# Patient Record
Sex: FEMALE | Race: WHITE | NOT HISPANIC OR LATINO | Employment: OTHER | URBAN - METROPOLITAN AREA
[De-identification: names, ages, dates, MRNs, and addresses within clinical notes are randomized per-mention and may not be internally consistent; named-entity substitution may affect disease eponyms.]

---

## 2017-03-23 ENCOUNTER — HOSPITAL ENCOUNTER (OUTPATIENT)
Dept: RADIOLOGY | Facility: MEDICAL CENTER | Age: 67
End: 2017-03-23
Attending: FAMILY MEDICINE
Payer: MEDICARE

## 2017-03-23 DIAGNOSIS — Z13.9 SCREENING: ICD-10-CM

## 2017-03-23 PROCEDURE — 77063 BREAST TOMOSYNTHESIS BI: CPT

## 2020-06-08 ENCOUNTER — HOSPITAL ENCOUNTER (OUTPATIENT)
Dept: HOSPITAL 8 - CFH | Age: 70
Discharge: HOME | End: 2020-06-08
Attending: FAMILY MEDICINE
Payer: MEDICARE

## 2020-06-08 DIAGNOSIS — Z12.31: Primary | ICD-10-CM

## 2020-06-08 PROCEDURE — 77067 SCR MAMMO BI INCL CAD: CPT

## 2020-06-08 PROCEDURE — 77063 BREAST TOMOSYNTHESIS BI: CPT

## 2021-01-15 DIAGNOSIS — Z23 NEED FOR VACCINATION: ICD-10-CM

## 2021-07-08 ENCOUNTER — APPOINTMENT (RX ONLY)
Dept: URBAN - METROPOLITAN AREA CLINIC 4 | Facility: CLINIC | Age: 71
Setting detail: DERMATOLOGY
End: 2021-07-08

## 2021-07-08 DIAGNOSIS — L57.0 ACTINIC KERATOSIS: ICD-10-CM

## 2021-07-08 DIAGNOSIS — L81.4 OTHER MELANIN HYPERPIGMENTATION: ICD-10-CM

## 2021-07-08 DIAGNOSIS — D18.0 HEMANGIOMA: ICD-10-CM

## 2021-07-08 DIAGNOSIS — D22 MELANOCYTIC NEVI: ICD-10-CM

## 2021-07-08 DIAGNOSIS — L82.1 OTHER SEBORRHEIC KERATOSIS: ICD-10-CM

## 2021-07-08 DIAGNOSIS — D485 NEOPLASM OF UNCERTAIN BEHAVIOR OF SKIN: ICD-10-CM

## 2021-07-08 PROBLEM — D48.5 NEOPLASM OF UNCERTAIN BEHAVIOR OF SKIN: Status: ACTIVE | Noted: 2021-07-08

## 2021-07-08 PROBLEM — D22.5 MELANOCYTIC NEVI OF TRUNK: Status: ACTIVE | Noted: 2021-07-08

## 2021-07-08 PROBLEM — D18.01 HEMANGIOMA OF SKIN AND SUBCUTANEOUS TISSUE: Status: ACTIVE | Noted: 2021-07-08

## 2021-07-08 PROCEDURE — ? BIOPSY BY SHAVE METHOD

## 2021-07-08 PROCEDURE — ? REFERRAL CORRESPONDENCE

## 2021-07-08 PROCEDURE — 11102 TANGNTL BX SKIN SINGLE LES: CPT

## 2021-07-08 PROCEDURE — 99203 OFFICE O/P NEW LOW 30 MIN: CPT | Mod: 25

## 2021-07-08 PROCEDURE — 17000 DESTRUCT PREMALG LESION: CPT | Mod: 59

## 2021-07-08 PROCEDURE — ? LIQUID NITROGEN

## 2021-07-08 PROCEDURE — ? COUNSELING

## 2021-07-08 ASSESSMENT — LOCATION ZONE DERM
LOCATION ZONE: LEG
LOCATION ZONE: NOSE
LOCATION ZONE: TRUNK
LOCATION ZONE: LIP

## 2021-07-08 ASSESSMENT — LOCATION DETAILED DESCRIPTION DERM
LOCATION DETAILED: LEFT BUTTOCK
LOCATION DETAILED: LEFT INFERIOR VERMILION LIP
LOCATION DETAILED: NASAL DORSUM
LOCATION DETAILED: LEFT PROXIMAL CALF

## 2021-07-08 ASSESSMENT — LOCATION SIMPLE DESCRIPTION DERM
LOCATION SIMPLE: NOSE
LOCATION SIMPLE: LEFT CALF
LOCATION SIMPLE: LEFT LIP
LOCATION SIMPLE: LEFT BUTTOCK

## 2021-09-08 NOTE — PROGRESS NOTES
Urogynecology and Pelvic Reconstructive Surgery Consultation Visit    Akilah Landry MRN:[id number] :1950    Referred by: Dr. Jessie Sloan    Reason for Visit:   Chief Complaint   Patient presents with   • Uterine Prolapse         Subjective     History of Presenting Illness:    Ms.Janice Anika Landry is a 71 y.o. year old P2 who was referred by her ObGyn Dr. Jessie Sloan for the evaluation and management of prolapse/cystocele.     She first noticed symptoms 1.5 years ago, and is progressively worsening. Notices the most when gardening and bending over. Feels like something falling out of the vagina. She strains to have a BM and this brings out prolapse. Denies splinting      Prior Pelvic surgery:   Tubal ligation     Prior treatment:   No     Fluid intake:   4 8oz glasses water  Occasional juice - cranberry    Pelvic floor symptom review:     Bladder:   Voids per day: 3-5 Voids per night: 0-1      Urinary incontinence episodes per day: occasional    Urge leakage:  On Movement to Bathroom and Full Bladder   Stress leakage: None   Continuous / insensible urine loss: No    Nocturnal enuresis: No    Leakage volume: Drops   Number of pads/day: none    Bladder emptying: Complete   Voiding symptoms: None   UTI in last 12 months: No   Other urologic history: none      Prolapse:     Prolapse symptoms: Bulge, Exteriorized Tissue and Pelvic Pressure   Degree of prolapse: Beyond Introitus   Duration of prolapse symptoms: 1.5y      Bowel:    Constipation: Yes, dietary related   Bowel movements per day: 1    Straining to empty bowels: Yes   Splinting to evacuate: No    Painful evacuation: No    Difficulty emptying rectum: No    Incontinence to stool: No   Incontinence to gas: No     Blood in stool: No    Hemorrhoids: No    Bowel conditions: none   Most recent colonoscopy: 2018, return in 5 years       Sexual function:    Sexually active: No , however may be interested again if she meets someone   Gender of partners:  "Male   Pain with intercourse: No       Pelvic Pain: No      Past medical and surgical history    Past obstetric history   Number of vaginal deliveries: 2   Number of  deliveries: 0   History of vacuum/forceps: No    History of obstetric anal sphincter injury: No     Past gynecological history:    Last menstrual period: Patient's last menstrual period was 2001.   History of abnormal uterine bleeding: No    History of fibroids: No    History of endometrial polyps:  No    History of endometriosis: No    History of cervical dysplasia: No    Last pap:    Prior GYN surgery: Tubal Ligation    Past medical history:  Past Medical History:   Diagnosis Date   • GERD (gastroesophageal reflux disease) 3/5/2015   • Trigger finger of right hand 3/5/2015   • Hypertension      Past surgical history:  Past Surgical History:   Procedure Laterality Date   • TUBAL COAGULATION LAPAROSCOPIC BILATERAL     • WRIST ORIF      right     Medications:has a current medication list which includes the following prescription(s): estradiol, mometasone, benazepril, and omeprazole.  Allergies:Patient has no known allergies.  Family history:  Family History   Problem Relation Age of Onset   • Cancer Sister         breast   • Heart Disease Mother    • Hypertension Mother    • Cancer Mother         breast   • Heart Disease Father         pacemaker   • Hypertension Father    • Hypertension Sister    • Cancer Maternal Grandfather    • Heart Disease Maternal Grandfather      Social history: reports that she has quit smoking. She has never used smokeless tobacco. She reports current alcohol use. She reports that she does not use drugs.         Objective        /85 (BP Location: Left arm, Patient Position: Sitting, BP Cuff Size: Adult)   Ht 1.727 m (5' 8\")   Wt 68.9 kg (152 lb)   LMP 2001   BMI 23.11 kg/m²     Physical Exam  Constitutional:       Appearance: Normal appearance.   HENT:      Head: Normocephalic.      " Mouth/Throat:      Mouth: Mucous membranes are moist.   Cardiovascular:      Rate and Rhythm: Normal rate.   Pulmonary:      Effort: Pulmonary effort is normal.   Abdominal:      Palpations: Abdomen is soft. There is no mass.      Tenderness: There is no abdominal tenderness.   Skin:     General: Skin is warm and dry.   Neurological:      Mental Status: She is alert.   Psychiatric:         Mood and Affect: Mood normal.         Genitourinary:    External female genitalia: WNL   Vulva: WNL   Bulbocavernosus reflex: Intact   Anal wink reflex: Intact   Perineal sensation: Absent   Urethra: WNL   Vagina: Atrophic   Atrophy: Moderate   Cough stress test: Negative    Pelvic floor:    POP-Q: Aa +2 / Ba +2 / TVL 10 / C -4 / D -8 / Ap -2  / Bp -2 / GH 3.5 / PB 2 AaBa=0 with apex supported    Prolapse stage: 3, anterior predominant   Paravaginal defect: minor bilateral   Cervical elongation: No    Urethral tenderness: No    Bladder/ suprapubic tenderness: No    Levator tenderness: None   Levator muscle tone: WNL   Pelvic floor contraction strength (modified Oxford scale): 3=Moderate   Pelvic floor contraction duration: Brief    Bimanual exam: Small, Mobile Uterus, no palpable adnexal masses   Anal resting tone: WNL   Anal squeeze tone: WNL   Palpable anal sphincter defect: No    Granulation tissue: No    Epithelial erosions: No    Epithelial ulcerations: No    Fistula: None   Vaginal band/stricture: No     Procedure Performed: No    Diagnostic test and records review:    Urine dipstick: neg     Post-void residual: 17mL, performed by Bladder Scanner    Labs: no recent labs    Radiology: n/a    Documentation reviewed: Prior EMR Records    Outside records reviewed: 3 pages           Assessment & Plan     Ms.Janice Anika Landry is a 71 y.o. year old P2 with stage 3 anterior predominant prolapse. We discussed my recommendations for further diagnosis and treatment at length today.     1. Midline cystocele  2. Incomplete uterovaginal  prolapse  3. Rectocele, female  Ms. Landry has symptomatic pelvic organ prolapse, anterior predominant. I reviewed the clinical findings and discussed the pathogenesis extensively, including genetic tendency, aging, menopause and childbirth injuries. Also discussed options for management, including both nonsurgical and surgical options.   - Nonsurgical options include both expectant management and pessary use. I discussed different types of pessary as well as pessary care. The patient can be fitted with a pessary device in the office by a physician and the pessary can either be removed regularly by the patient or left in place, returning to the office every 3 to 6 months for evaluation. I also discussed concomitant treatment with vaginal estrogen at least 2 times a week to help prevent vaginal erosions and infection.   - Surgical options include vaginal and abdominal reconstructive approaches, as well as obliterative approaches which close the vaginal canal. She desires continued ability to have penetrative intercourse and is not a candidate for obliterative approach.  I reviewed that abdominal approaches would include both native tissue repair or a mesh-augmented sacralcolpopexy, which is usually performed through robotic-assisted laparoscopy or sometimes through a Pfannenstiel skin incision for more complex cases. Lightweight synthetic mesh augmentation via sacrocolpopexy is offered for abdominal approaches, which can reduce recurrence risk of prolapse but has a risk (1-3%) for mesh exposure. She is also a candidate for native tissue vaginal surgery which could include a vaginal hysterectomy with vault suspension or uterine-sparing hysteropexy, anterior/posterior repair, and perineorrhaphy. I discussed the technical details including risks and benefits of each of these options with the patient at length. All questions were answered.  - She opts for the most durable reconstructive repair, and will be booked for exam  under anesthesia, robotic assisted laparoscopic supracervical hysterectomy, bilateral salpingooophorectomy, sacrocervicopexy, possible anterior repair, possible paravaginal repair, possible posterior repair/perineorrhaphy, possible mid urethral sling, cystourethroscopy, and any indicated procedure.  - She will return for a preoperative visit, consents, and simple cystometric gram 1 week prior to her surgery  - Printed counseling detailing preoperative considerations Intra-Op considerations and postop considerations was given to the patient with instructions to review prior to her preoperative visit.  Please see after visit summary for details    4. Atrophic vaginitis  Her exam confirms vaginal atrophy / genitorurinary syndrome of menopause. This is very common and due to low estrogen levels, which render the vaginal tissue thin, irritated, and open to colonization with gut sujit. This can lead to irritation, dryness, painful sex, urinary infections and urinary urgency. Discussed risks, benefits, and indications for vaginal estrogen therapy.  Vaginal estrogen has negligible absorption into the bloodstream and is not associated with increased risks for uterine or breast cancers.  Recommend initiating therapy preoperatively to enhance quality of vaginal tissues.  Prescription given for estrace vaginal cream to be placed inside vagina nightly for 2 weeks, then twice weekly thereafter. The effects of vaginal estrogen can take weeks to months.    Other orders  - estradiol (ESTRACE VAGINAL) 0.1 MG/GM vaginal cream; Apply 1g cream inside vagina nightly for 2 weeks, then twice per week thereafter  Dispense: 1 Each; Refill: 3           Preethi Amezcua MD, FACOG    Female Pelvic Medicine and Reconstructive Surgery  Department of Obstetrics and Gynecology  [facility]    CC: Jessie Sloan MD    This medical record contains text that has been entered with the  assistance of computer voice recognition and dictation software.  Therefore, it may contain unintended errors in text, spelling, punctuation, or grammar

## 2021-09-09 ENCOUNTER — GYNECOLOGY VISIT (OUTPATIENT)
Dept: OBGYN | Facility: CLINIC | Age: 71
End: 2021-09-09
Payer: MEDICARE

## 2021-09-09 VITALS
DIASTOLIC BLOOD PRESSURE: 85 MMHG | SYSTOLIC BLOOD PRESSURE: 133 MMHG | BODY MASS INDEX: 23.04 KG/M2 | WEIGHT: 152 LBS | HEIGHT: 68 IN

## 2021-09-09 DIAGNOSIS — N81.11 MIDLINE CYSTOCELE: ICD-10-CM

## 2021-09-09 DIAGNOSIS — N81.6 RECTOCELE, FEMALE: ICD-10-CM

## 2021-09-09 DIAGNOSIS — N95.2 ATROPHIC VAGINITIS: ICD-10-CM

## 2021-09-09 DIAGNOSIS — N81.2 INCOMPLETE UTEROVAGINAL PROLAPSE: Primary | ICD-10-CM

## 2021-09-09 PROCEDURE — 99205 OFFICE O/P NEW HI 60 MIN: CPT | Performed by: STUDENT IN AN ORGANIZED HEALTH CARE EDUCATION/TRAINING PROGRAM

## 2021-09-09 RX ORDER — ESTRADIOL 0.1 MG/G
CREAM VAGINAL
Qty: 1 EACH | Refills: 3 | Status: SHIPPED | OUTPATIENT
Start: 2021-09-09

## 2021-09-09 NOTE — PATIENT INSTRUCTIONS
Pre-op: What you should know before your surgery  Laparoscopic/robotic reconstructive surgery for prolapse  (mesh-augmented)      What you should know before your surgery  You are scheduled for surgery to fix your prolapse (vaginal bulge) using sutures and lightweight mesh to suspend pelvic structures back into a supported position. These surgeries are minimally-invasive, using only small “keyhole” cuts on the abdomen and in the vagina. The documents in this packet will outline each part of the surgery. There may be a few “possible” surgeries listed. We use the word “possible” because we tailor the surgery based on your needs. This means we won't know how much surgery you'll need until after you receive anesthesia and fall asleep.     When you fall asleep, the pelvic muscles will relax, allowing us to determine how much surgery you need. In general, we will do as few procedures as possible to fix your prolapse but address each area as needed.     Goals of prolapse surgery: The primary goal of surgery is to repair the prolapse and eliminate the symptoms of a vaginal bulge and pressure. Depending on your symptoms, the surgery may possibly improve bladder emptying and/or rectal emptying, as well as urinary incontinence.      Prolapse recurrence:  Your procedure will utilize a permanent implanted mesh material to enhance the support of your pelvic structures, and reduce the likelihood of your prolapse returning. This will be described in more detail below in the details of your surgery. However, even with this mesh, there is still a long-term risk of prolapse returning (30% of women), and needing an additional surgery (<10% of women).     Sexual function:  Following surgical repair, most patients experience improvements in their sexual function. Surgery tends to improve the general discomfort of sex associated with prolapse. However, if you have a long history of pain with sex (either externally or internally), this is  unlikely to be due to prolapse. Therefore, surgery may not improve these symptoms.     Surgery involves the cutting and re-sewing of the vaginal tissues. Scar tissue may form after surgery, which can lead to painful sex for some patients. In many patients, this new pain goes away over time or can be improved with pelvic physical therapy, lubrication, dilators, or vaginal estrogen.     Bladder urgency and incontinence after surgery:  Bladder tests may be performed before your surgery to see whether you are at risk for new or worsening urinary leakage after prolapse repair. Our bladder testing is a great tool to find out who is at risk for urinary leakage, but it is not perfect. There is a chance you may have new or increased leakage with coughing, sneezing, or laughing after surgery. Problems with leakage can be addressed in a number of ways. Bladder urgency and/or frequency often improves after surgery, but it may worsen in some patients. We have various medications and therapies that can help with this urgency after surgery, if necessary.    Risk of postoperative pain:   Pain after prolapse surgery is a normal part of the healing process, but usually well-tolerated. Common areas of pain include the pelvis, buttocks, hips and back, often related to positioning.   Try changing your position when sitting or resting in bed to relieve the pain    Expect to have some pain when you are discharged home. This should improve with time and with use of medications. See “after surgery” instructions for more details on pain control.      General risks of pelvic reconstructive surgery: Specific risks for your procedure are discussed separately  ? Anesthesia: With modern anesthetics and monitoring equipment, complications due to anesthesia are very rare. Your anesthesiologist will discuss what will be most suitable for you on the day of surgery  ? Bleeding: All surgery results in bleeding, however this surgery usually amounts to  blood loss between a tablespoon and a teacup. Large amounts of blood loss that requires a blood transfusion are not common (only 1-2% of women) in prolapse surgery.  ? Blood transfusion, if needed, is safe. Minor reactions like fever or allergy occur in less than 1% of transfusions. Riskan infection or mismatched blood is very rare (less than 1 out of every 100,000 transfusions).   ? Infection: The most common infection with prolapse surgery is a urinary tract infection (UTI). This is easily treated. Wound infections occur in 2-3% of patients. Rarely, infection of the abdominal/vaginal wounds may occur, or abscesses in the pelvis may develop. These infections may need to be treated with antibiotics or another procedure to fix them. Risk factors for infections and wound complications include diabetes, smoking, obesity, and other chronic illnesses.  ? Blood Clots: Clots in the blood vessels of the legs and lungs are potentially dangerous and can occur in patients undergoing prolapse surgery. This is prevented with leg compression during surgery, and sometimes, an injected blood thinner. We strongly encourage you to stay mobile because this is an important way to prevent clots.  ? Damage to surrounding organs. The pelvic organs are all very close together. The risk of damage to other organs increases if you have had prior pelvic surgeries, as well as a history of endometriosis or pelvic infection. These conditions can cause scar tissue to develop in the pelvis. Scar tissue can cause organs to stick together, making the surgery more difficult.    ? Ureter and bladder damage: The ureters are tubes that carry urine from the kidneys to the bladder. They travel very close to the uterus and vagina. The bladder is attached to both your uterus and the front of the vagina. Damage/injury can happen during prolapse-repair procedures. A cystoscopy (camera in the bladder) will be done during your surgery to ensure there is no  bladder or ureter damage/injury. If injury occurs, it may require temporary placement of a stent (drainage tube). In rare cases, a larger abdominal incision may be needed to repair the injury. A bladder catheter may need to stay in place temporarily after surgery.  ? Bowel damage: Bowel damage/injury is uncommon during your surgery. Any damage that is seen in surgery will be fixed. Very rarely, a temporary ostomy (connection of bowel to the front of the abdomen and collection of stool with a bag) is required for a higher-risk bowel injury.  ? Vascular damage: Major damage to blood vessels is uncommon. If this occurs, it may require a larger surgery and/or blood transfusion.  ? Fistula: A fistula is an abnormal connection between the vagina and either the bladder or rectum. A fistula leads to leakage of urine or stool. These are rare complications that arise during healing from pelvic surgery that sometimes require additional surgeries to correct. We take great care is during your surgery to prevent these fistulas. If they do occur, your Urogynecologist is the leading expert in fixing them.      Main Procedure:    Laparoscopic/robotic supra-cervical hysterectomy and sacrocervicopexy   (removal of uterus and suspension of cervix to backbone with mesh)       Once you are asleep, small “keyhole” incisions (smaller than ½ inch) will be made in the abdomen in order to place our instruments. Your surgeon may use a robotic system to help complete the surgery through the small incisions. The top of the uterus and your fallopian tubes will then be removed, leaving the lower portion of the uterus (cervix) in place. This is called a hysterectomy and salpingectomy. If you have decided to also remove the ovaries (oophorectomy), this will also be performed. Then, a lightweight permanent mesh will be sewn onto the cervix and the front and back surfaces of the vagina. This will lift up the prolapse and attach to a strong band of  tissue on your sacrum (lower back bone). We will then look into your bladder with a camera to make sure that no damage was done, and that your ureters (the tubes that carry urine from the kidneys to the bladder) are working. Sometimes we are unable to perform the mesh suspension safely, and may need to adjust to a different approach using other structures around the vagina.    The mesh we use is a lightweight permanent material. This material is stronger than your own tissues, which means there is less chance of the prolapse coming back again. Please note that the abdominal mesh for this procedure is not included in the recent controversies involving vaginal meshes.     Everybody's body responds to permanent material differently. 2-5% of women who undergo this surgery may note pain with sex, or notice the mesh exposed through the vagina in the future.  This is often easily treated with medication or a small procedure. In rarer instances the mesh can erode into surrounding structures, or lead to pain or recurrent infections. Sometimes the mesh may have to be removed, which would entail additional surgery.  Since we are not removing your cervix, you will need to continue with routine Pap screening for cervical cancer with your gynecologist.        After this procedure is complete, you will be re-examined and then proceed with the following possible procedures:    Possible  Surgical Procedure:   Paravaginal repair  (repair sides of vagina with suture)      You may need a paravaginal repair if the above procedures completely fix your cystocele (anterior/bladder prolapse). In this case, your anterior/bladder prolapse may be due to a weakness in the attachment of the side walls of the vagina. This is repaired using a series of sutures through the vaginal muscle, attaching them to the side of the pelvis (arcus fascia). The paravaginal repair can be performed during laparoscopic, abdominal, or vaginal procedures.     The  risks of this procedure are similar to the above procedures. Often, permanent sutures are used, which can sometimes become exposed (stick out) in the vagina and lead to bleeding  or pain. These can be treated medically with estrogen cream or by removing the suture.        Possible Procedure:   Anterior Repair  (fix prolapse of the vagina/bladder)      After the above procedure, if there is still prolapse in the anterior (front) compartment under the bladder, your surgeon will perform an “anterior repair” to reinforce this tissue. A cut is made along the front wall of your vagina where the bladder is pushing down, and your skin is  from the underlying supportive tissue. This stronger tissue underneath is then repaired using sutures that will dissolve over time. Sometimes excess skin is removed. The skin is then closed. A cystoscopy (camera in bladder) will be performed to confirm the repair is successful and no injury has occurred.     Specific risks for this procedure include damage to the bladder, urethra (the tube where you urinate from) or ureters (tubes that carry urine from the kidneys to the bladder). This is uncommon. Bladder damage may result in having a catheter in the bladder for a longer amount of time.         Post-op: What to expect after your surgery    For urgent post-operative questions or concerns, please call Dr. Amezcua's direct on-call cell line at [phone].     For less-urgent matters (Monday - Friday), you may send a message through [employer] or call the general Women's Health line at [phone] [phone].     Recovery room  You can expect to stay in the recovery room for a few hours until you are alert. There may be a gauze packing in your vagina, which will be removed before you go home. Pain is normal after surgery but should be tolerable. Your pain will become less over the first 2 weeks. If your pain is difficult to control or you need more nursing care, you will stay in the hospital  overnight. Most patients do very well going home on the day of surgery.     Bladder function  You will wake up with a small tube (catheter) in your bladder. A bladder test, called a “void trial”, will be performed by the nurse before you go home. The test is done to make sure you can empty your bladder normally. To do the bladder test, the nurse will fill your bladder with sterile water, remove the catheter, and give you 30 minutes to try and urinate (pee) on your own. If you cannot urinate, or if you only urinate a small amount, you will need to:   ? Go home with a catheter that stays in your bladder OR  ? Learn to put a catheter into your bladder a few times a day to empty it    Use of a catheter is temporary and usually needed for 2-4 days. It is very common for the bladder to work slowly, or sluggishly, after this type of surgery. One in every 3-4 patients will require some type of catheterization. An antibiotic may be prescribed while the catheter is in place to decrease the risk of infection.    Call the surgeon for treatment, if you have signs and symptoms of a urinary tract infection, including:  ? Burning with urination  ? Bladder pain  ? Worsening need to urinate right away,  ? Urine with a bad smell    Vaginal care  Do not go swimming, take sitting baths, and have sexual intercourse for 6 weeks after surgery Do not place anything in your vagina except vaginal estrogen cream, if instructed to do so by your surgeon.   Vaginal discharge and bleeding/spotting is also normal through the entire 6-week recovery. Sometimes small sutures will fall out of the vagina as they dissolve. This is normal. Contact the office with any heavy bleeding, foul discharge or worsening pain.. Contact us with any heavy bleeding, foul discharge or worsening pain.     Pain management  Surgical pain is controlled in most patients with only acetaminophen (Tylenol) and non-steroidal anti-inflammatory drugs (NSAIDs), such as ibuprofen  (Advil). These drugs can be taken together because they do not interact with each other. Check your prescription for specific dosing instructions. A cold compress can help with pain in the vaginal area.  Sometimes, a short course of narcotics, such as oxycodone or hydromorphone is required. We do not recommend using narcotics regularly as it can lead to constipation or dizziness and falls. Do not drive or operate heavy machinery while using narcotics. You are unlikely to become addicted if you need to take a narcotic medication a few times within the first week of your surgery.  After the first few days to one week, your pain should decrease and you should not have pain severe enough to need narcotics. If you continue having severe pain, contact your surgeon for re-evaluation.     Abdominal wound care  The incisions on your abdomen will be closed with either small bandages or a surgical glue. There are also tiny dissolving sutures beneath the skin. You can shower with these in place. In shower, let the soapy warm water run over you incisions. Do not scrub or wipe you incisions. The bandages will fall off on their own, or you can remove them after at least 3 days if they become discolored or dirty. The glue will also fall off on its own after a few weeks. Small sutures that pop out through the skin are normal and will dissolve over time. Contact us if you feel increasing pain or warmth at the incision. Also, call if you see increased redness or discharge (pus) at the incision.      Bowel function  Constipation is common after surgery, and it sometimes take several days before having a normal bowel movement. It is important to use a bowel regimen to keep your stools soft and avoid straining with bowel movements, which may damage the prolapse repair before it has healed. Most patients will be given a prescription for a stool softener (docusate) as well as a gentle laxative (Miralax or lactulose), which adds water to  the stool to make it easier to pass. Take these daily throughout your recovery, and hold for a day if you develop diarrhea. Please call us if you experience any repeated episodes of vomiting, worsening abdominal pain/bloating, or are unable to have a bowel movement for more than 3 days.     Activity restrictions  During the first 6 weeks you should avoid any type of heavy lifting.  Gentle walking is good exercise. Start with about 10 minutes a day when you feel ready and build up gradually. Avoid repetitive squatting or bending at the waist. Avoid any fitness-type training, aerobics, etc. for at least 6 weeks after surgery. Listen to your body during the recovery period, and increase activity when you feel comfortable. Generally, you will need 4-6 weeks off work. This period may be longer if you have a very physical job.    Return to sex  When your surgeon clears you to resume sex (if desired), you should start out slowly and to use adequate lubrication. Your vagina and pelvis have been re-structured, and it can take time to get used to sex after surgery. Most scar tissue softens over time and discomfort improves. If discomfort does not go away, contact us to discuss to schedule an exam and to discuss further options. In some cases, your surgeon may prescribe a low dose of vaginal estrogen to help with vaginal dryness and pain that may happen with sex.

## 2021-09-09 NOTE — Clinical Note
Dear Dr. Sloan,     Thank you for the interesting referral. Please see my consultation note attached.     Best regards,   Preethi Amezcua MD

## 2021-09-09 NOTE — NON-PROVIDER
Patient here c/o bladder is falling out   LMP= 2001   BCM:tubes tied   Last pap date/result:06/11/21 pt states normal   Last mammogram if applicable   Phone number:[phone]  Pharmacy confirmed.

## 2021-09-21 ENCOUNTER — GYNECOLOGY VISIT (OUTPATIENT)
Dept: OBGYN | Facility: CLINIC | Age: 71
End: 2021-09-21
Payer: MEDICARE

## 2021-09-21 VITALS
HEART RATE: 71 BPM | WEIGHT: 155 LBS | SYSTOLIC BLOOD PRESSURE: 131 MMHG | BODY MASS INDEX: 23.57 KG/M2 | DIASTOLIC BLOOD PRESSURE: 78 MMHG

## 2021-09-21 DIAGNOSIS — N81.6 RECTOCELE, FEMALE: ICD-10-CM

## 2021-09-21 DIAGNOSIS — N81.11 MIDLINE CYSTOCELE: ICD-10-CM

## 2021-09-21 DIAGNOSIS — N81.2 INCOMPLETE UTEROVAGINAL PROLAPSE: Primary | ICD-10-CM

## 2021-09-21 LAB
APPEARANCE UR: CLEAR
BILIRUB UR STRIP-MCNC: NORMAL MG/DL
COLOR UR AUTO: YELLOW
GLUCOSE UR STRIP.AUTO-MCNC: NEGATIVE MG/DL
KETONES UR STRIP.AUTO-MCNC: NEGATIVE MG/DL
LEUKOCYTE ESTERASE UR QL STRIP.AUTO: NEGATIVE
NITRITE UR QL STRIP.AUTO: NEGATIVE
PH UR STRIP.AUTO: 7.5 [PH] (ref 5–8)
PROT UR QL STRIP: NEGATIVE MG/DL
RBC UR QL AUTO: NEGATIVE
SP GR UR STRIP.AUTO: 1.02
UROBILINOGEN UR STRIP-MCNC: NORMAL MG/DL

## 2021-09-21 PROCEDURE — 81002 URINALYSIS NONAUTO W/O SCOPE: CPT | Performed by: STUDENT IN AN ORGANIZED HEALTH CARE EDUCATION/TRAINING PROGRAM

## 2021-09-21 PROCEDURE — 51725 SIMPLE CYSTOMETROGRAM: CPT | Performed by: STUDENT IN AN ORGANIZED HEALTH CARE EDUCATION/TRAINING PROGRAM

## 2021-09-21 RX ORDER — IBUPROFEN 400 MG/1
TABLET ORAL
Qty: 60 TABLET | Refills: 1 | Status: SHIPPED | OUTPATIENT
Start: 2021-09-21

## 2021-09-21 RX ORDER — ACETAMINOPHEN 500 MG
TABLET ORAL
Qty: 60 TABLET | Refills: 1 | Status: SHIPPED | OUTPATIENT
Start: 2021-09-21

## 2021-09-21 RX ORDER — POLYETHYLENE GLYCOL 3350 17 G/17G
17 POWDER, FOR SOLUTION ORAL DAILY
Qty: 30 EACH | Refills: 1 | Status: SHIPPED | OUTPATIENT
Start: 2021-09-21

## 2021-09-21 NOTE — NON-PROVIDER
PT here today for a pre op 09/29/2021   PT States no questions or concerns   Hysterectomy? Never  Good #:  [phone]   Bladder Scan: N/A  Pharmacy Verified

## 2021-09-21 NOTE — PROCEDURES
Procedure Note - Cystometrogram    Procedure: Simple cystometrogram (38755)    Pre-operative diagnosis:  Incomplete uterovaginal prolapse (N81.2),     Indication: Ms. Panchal is a 71 year old with symptomatic uterovaginal prolapse desiring definitive surgical management. Her symptoms include bulge and pressure. She presents for simple CMG testing today to assess for occult stress incontinence with prolapse reduction in order to guide surgical management    Verbal consent was obtained after review of risk and benefit.     Chaperone: Elida Smith    Procedure: The patient was placed in the lithotomy position in the exam. She underwent sterile prep with betadine prior to catheterization. There was a negative urinalysis.  A 16F Gonzalez catheter was easily placed into the bladder.  With a postvoid residual of 30 mL.  The bladder was slowly filled against gravity with sterile water, until capacity was reached.Prolapse was reduced with a cotton scopette for stress testing. There were no complications.     Findings:    - First sensation: 80 mL  - First desire: 240 mL  - Strong Desire: 300  mL  - CMG capacity: 400 mL  - Stress leakage with prolapse reduction:   o Empty: Negative  o 150mL: Negative  o Capacity: Negative   - Leakage with DO: No  - Uninhibited detrusor contractions present: No    Assessment:   No occult stress urinary incontinence is demonstrated with reduction of prolapse during CMG today.     Plan:   - No concomitant antiincontinence procedures recommended during her prolapse repair surgery     See corresponding E/M visit for full surgical and perioperative plan.       Preethi Amezcua MD, FACOG    Female Pelvic Medicine and Reconstructive Surgery  Department of Obstetrics and Gynecology  [facility]

## 2021-09-21 NOTE — PROGRESS NOTES
Urogynecology and Pelvic Reconstructive Surgery Consultation Visit    Akilah Landry MRN:[id number]    Referred by: Dr. Jessie Sloan    Reason for Visit:   Chief Complaint   Patient presents with   • Uterine Prolapse         Subjective     History of Presenting Illness:    Ms.Janice Anika Landry is a 70yo with stage 3 uterovaginal prolapse wo is scheduled for upcoming surgical correction. She reports having started vaginal estrogen therapy after last appointment without issue, although the medication was expensive    Prior HPI:   She was referred by her ObGyn Dr. Jessie Sloan for the evaluation and management of prolapse/cystocele.   She first noticed symptoms 1.5 years ago, and is progressively worsening. Notices the most when gardening and bending over. Feels like something falling out of the vagina. She strains to have a BM and this brings out prolapse. Denies splinting      Prior Pelvic surgery:   Tubal ligation     Prior treatment:   No     Fluid intake:   4 8oz glasses water  Occasional juice - cranberry    Pelvic floor symptom review:     Bladder:   Voids per day: 3-5 Voids per night: 0-1      Urinary incontinence episodes per day: occasional    Urge leakage:  On Movement to Bathroom and Full Bladder   Stress leakage: None   Continuous / insensible urine loss: No    Nocturnal enuresis: No    Leakage volume: Drops   Number of pads/day: none    Bladder emptying: Complete   Voiding symptoms: None   UTI in last 12 months: No   Other urologic history: none      Prolapse:     Prolapse symptoms: Bulge, Exteriorized Tissue and Pelvic Pressure   Degree of prolapse: Beyond Introitus   Duration of prolapse symptoms: 1.5y      Bowel:    Constipation: Yes, dietary related   Bowel movements per day: 1    Straining to empty bowels: Yes   Splinting to evacuate: No    Painful evacuation: No    Difficulty emptying rectum: No    Incontinence to stool: No   Incontinence to gas: No     Blood in stool: No     Hemorrhoids: No    Bowel conditions: none   Most recent colonoscopy: 2018, return in 5 years       Sexual function:    Sexually active: No , however may be interested again if she meets someone   Gender of partners: Male   Pain with intercourse: No       Pelvic Pain: No      Past medical and surgical history    Past obstetric history   Number of vaginal deliveries: 2   Number of  deliveries: 0   History of vacuum/forceps: No    History of obstetric anal sphincter injury: No     Past gynecological history:    Last menstrual period: Patient's last menstrual period was 2001.   History of abnormal uterine bleeding: No    History of fibroids: No    History of endometrial polyps:  No    History of endometriosis: No    History of cervical dysplasia: No    Last pap:    Prior GYN surgery: Tubal Ligation    Past medical history:  Past Medical History:   Diagnosis Date   • GERD (gastroesophageal reflux disease) 3/5/2015   • Trigger finger of right hand 3/5/2015   • Hypertension      Past surgical history:  Past Surgical History:   Procedure Laterality Date   • TUBAL COAGULATION LAPAROSCOPIC BILATERAL     • WRIST ORIF      right     Medications:has a current medication list which includes the following prescription(s): acetaminophen, ibuprofen, polyethylene glycol/lytes, estradiol, mometasone, benazepril, and omeprazole.  Allergies:Patient has no known allergies.  Family history:  Family History   Problem Relation Age of Onset   • Cancer Sister         breast   • Heart Disease Mother    • Hypertension Mother    • Cancer Mother         breast   • Heart Disease Father         pacemaker   • Hypertension Father    • Hypertension Sister    • Cancer Maternal Grandfather    • Heart Disease Maternal Grandfather      Social history: reports that she has quit smoking. She has never used smokeless tobacco. She reports current alcohol use. She reports that she does not use drugs.         Objective        /78 (BP  Location: Right arm, Patient Position: Sitting, BP Cuff Size: Adult)   Pulse 71   Wt 70.3 kg (155 lb)   LMP 01/01/2001   BMI 23.57 kg/m²     Physical Exam  Vitals reviewed. Exam conducted with a chaperone present.   Constitutional:       Appearance: Normal appearance.   HENT:      Head: Normocephalic.      Mouth/Throat:      Mouth: Mucous membranes are moist.   Cardiovascular:      Rate and Rhythm: Normal rate.   Pulmonary:      Effort: Pulmonary effort is normal.   Abdominal:      Palpations: Abdomen is soft. There is no mass.      Tenderness: There is no abdominal tenderness.   Skin:     General: Skin is warm and dry.   Neurological:      Mental Status: She is alert.   Psychiatric:         Mood and Affect: Mood normal.         Genitourinary:    External female genitalia: WNL   Vulva: WNL   Bulbocavernosus reflex: Intact   Anal wink reflex: Intact   Perineal sensation: Absent   Urethra: WNL   Vagina: Atrophic   Atrophy: Moderate   Cough stress test: Negative    Pelvic floor (prior exam):    POP-Q: Aa +2 / Ba +2 / TVL 10 / C -4 / D -8 / Ap -2  / Bp -2 / GH 3.5 / PB 2 AaBa=0 with apex supported    Prolapse stage: 3, anterior predominant   Paravaginal defect: minor bilateral   Cervical elongation: No    Urethral tenderness: No    Bladder/ suprapubic tenderness: No    Levator tenderness: None   Levator muscle tone: WNL   Pelvic floor contraction strength (modified Oxford scale): 3=Moderate   Pelvic floor contraction duration: Brief    Bimanual exam: Small, Mobile Uterus, no palpable adnexal masses   Anal resting tone: WNL   Anal squeeze tone: WNL   Palpable anal sphincter defect: No    Granulation tissue: No    Epithelial erosions: No    Epithelial ulcerations: No    Fistula: None   Vaginal band/stricture: No     Procedure Performed:  cystometrogram (see separate note)             Assessment & Plan     Ms.Janice Anika Landry is a 71 y.o. year old P2 with stage 3 anterior predominant prolapse. We discussed my  recommendations for further diagnosis and treatment at length today.     1. Midline cystocele  2. Incomplete uterovaginal prolapse  3. Rectocele, female  Scheduled on 9/29 for robotic-assisted supracervical hysterectomy, bilateral salpingo-oophorectomy, sacrocervicopexy, cystourethroscopy, possible anterior repair, possible posterior repair/perineorrhaphy, possible laparotomy and any indicated procedure  Supplemental FPMRS Consent was signed and witnessed today. Pre-operative CMG did not demonstrate stress urinary incontinence with reduction of prolapse and she was counseled against a concomitant midurethral sling.   Benefits of surgery were reviewed, including functional outcomes (bladder/bowel/sexual). Risks of surgery were  also discussed including anesthesia, bleeding, infection, damage to surrounding organs (bladder, ureter, urethra, bowel, blood vessel, nerves), possible blood transfusion, recurrent prolapse,  mesh exposure/erosion, transient voiding dysfunction requiring catheterization, new/worsening urinary incontinence, pain with sex.   Specifically she was counselled as to what to expect on the day of surgery in the holding area, counseled that medical students may be involved in her care. She will likely go home on the same day as the surgery. She was counseled on what to expect in the recovery room including voiding trial and possible vaginal packing removal, as well as what to expect if voiding trial is unsuccessful - given options of indwelling catheter vs. intermittent straight cath.   Discussed trajectory of recovery, including maximizing NSAIDs and Tylenol, and that narcotics are not routinely given.  Discussed restrictions including heavy lifting requiring straining, driving while on narcotics, nothing in the vagina and no bathing/swimming for at least 6 weeks until evaluated in the office.*Please see the corresponding after visit summary counseling packet for detailed counseling provided for the  patient.   - Labs: CMP/BMP at pre-op visit  - Pre-op meds: acetaminophen 1000mg PO, phenazopyridine 200mg PO, Cefazolin 2gm IV,  - Post-op prescriptions sent today: ibuprofen, tylenol, miralax      4. Atrophic vaginitis  Continue vaginal estrogen until surgery, then resume 2 weeks post-op.            Preethi Amezcua MD, FACOG    Female Pelvic Medicine and Reconstructive Surgery  Department of Obstetrics and Gynecology  [facility]      This medical record contains text that has been entered with the assistance of computer voice recognition and dictation software.  Therefore, it may contain unintended errors in text, spelling, punctuation, or grammar

## 2021-09-23 ENCOUNTER — PRE-ADMISSION TESTING (OUTPATIENT)
Dept: ADMISSIONS | Facility: MEDICAL CENTER | Age: 71
End: 2021-09-23
Attending: STUDENT IN AN ORGANIZED HEALTH CARE EDUCATION/TRAINING PROGRAM
Payer: MEDICARE

## 2021-09-23 DIAGNOSIS — Z01.810 PRE-OPERATIVE CARDIOVASCULAR EXAMINATION: ICD-10-CM

## 2021-09-23 DIAGNOSIS — Z01.812 PRE-OPERATIVE LABORATORY EXAMINATION: ICD-10-CM

## 2021-09-23 LAB
ANION GAP SERPL CALC-SCNC: 12 MMOL/L (ref 7–16)
BASOPHILS # BLD AUTO: 1 % (ref 0–1.8)
BASOPHILS # BLD: 0.04 K/UL (ref 0–0.12)
BUN SERPL-MCNC: 23 MG/DL (ref 8–22)
CALCIUM SERPL-MCNC: 9.6 MG/DL (ref 8.5–10.5)
CHLORIDE SERPL-SCNC: 104 MMOL/L (ref 96–112)
CO2 SERPL-SCNC: 21 MMOL/L (ref 20–33)
CREAT SERPL-MCNC: 0.79 MG/DL (ref 0.5–1.4)
EKG IMPRESSION: NORMAL
EOSINOPHIL # BLD AUTO: 0.06 K/UL (ref 0–0.51)
EOSINOPHIL NFR BLD: 1.5 % (ref 0–6.9)
ERYTHROCYTE [DISTWIDTH] IN BLOOD BY AUTOMATED COUNT: 47.8 FL (ref 35.9–50)
GLUCOSE SERPL-MCNC: 90 MG/DL (ref 65–99)
HCT VFR BLD AUTO: 39.8 % (ref 37–47)
HGB BLD-MCNC: 12.6 G/DL (ref 12–16)
IMM GRANULOCYTES # BLD AUTO: 0.02 K/UL (ref 0–0.11)
IMM GRANULOCYTES NFR BLD AUTO: 0.5 % (ref 0–0.9)
LYMPHOCYTES # BLD AUTO: 1.44 K/UL (ref 1–4.8)
LYMPHOCYTES NFR BLD: 35 % (ref 22–41)
MCH RBC QN AUTO: 29.9 PG (ref 27–33)
MCHC RBC AUTO-ENTMCNC: 31.7 G/DL (ref 33.6–35)
MCV RBC AUTO: 94.3 FL (ref 81.4–97.8)
MONOCYTES # BLD AUTO: 0.42 K/UL (ref 0–0.85)
MONOCYTES NFR BLD AUTO: 10.2 % (ref 0–13.4)
NEUTROPHILS # BLD AUTO: 2.14 K/UL (ref 2–7.15)
NEUTROPHILS NFR BLD: 51.8 % (ref 44–72)
NRBC # BLD AUTO: 0 K/UL
NRBC BLD-RTO: 0 /100 WBC
PLATELET # BLD AUTO: 216 K/UL (ref 164–446)
PMV BLD AUTO: 10.1 FL (ref 9–12.9)
POTASSIUM SERPL-SCNC: 4 MMOL/L (ref 3.6–5.5)
RBC # BLD AUTO: 4.22 M/UL (ref 4.2–5.4)
SODIUM SERPL-SCNC: 137 MMOL/L (ref 135–145)
WBC # BLD AUTO: 4.1 K/UL (ref 4.8–10.8)

## 2021-09-23 PROCEDURE — U0003 INFECTIOUS AGENT DETECTION BY NUCLEIC ACID (DNA OR RNA); SEVERE ACUTE RESPIRATORY SYNDROME CORONAVIRUS 2 (SARS-COV-2) (CORONAVIRUS DISEASE [COVID-19]), AMPLIFIED PROBE TECHNIQUE, MAKING USE OF HIGH THROUGHPUT TECHNOLOGIES AS DESCRIBED BY CMS-2020-01-R: HCPCS

## 2021-09-23 PROCEDURE — 85025 COMPLETE CBC W/AUTO DIFF WBC: CPT

## 2021-09-23 PROCEDURE — 93010 ELECTROCARDIOGRAM REPORT: CPT | Performed by: INTERNAL MEDICINE

## 2021-09-23 PROCEDURE — 36415 COLL VENOUS BLD VENIPUNCTURE: CPT

## 2021-09-23 PROCEDURE — 80048 BASIC METABOLIC PNL TOTAL CA: CPT

## 2021-09-23 PROCEDURE — 93005 ELECTROCARDIOGRAM TRACING: CPT

## 2021-09-23 PROCEDURE — U0005 INFEC AGEN DETEC AMPLI PROBE: HCPCS

## 2021-09-23 PROCEDURE — C9803 HOPD COVID-19 SPEC COLLECT: HCPCS

## 2021-09-24 LAB
SARS-COV-2 RNA RESP QL NAA+PROBE: NOTDETECTED
SPECIMEN SOURCE: NORMAL

## 2021-09-28 NOTE — OR NURSING
COVID-19 Pre-surgery screenin. Do you have an undiagnosed respiratory illness or symptoms such as coughing or sneezing? No  a. Onset of Sx  n/a  b. Acute vs. chronic respiratory illness  n/a    2. Do you have an unexplained fever greater than 100.4 degrees Fahrenheit or 38 degrees Celsius?  No        3. Have you had direct exposure to a patient who tested positive for Covid-19?  No        4. Have you had any loss of your sense of taste or smell, N/V or sore throat?  No    Patient has been informed of 2 visitor policy and asked to wear a mask at all times.  Eating or drinking are only allowed in dining areas.   Yes

## 2021-09-29 ENCOUNTER — ANESTHESIA (OUTPATIENT)
Dept: SURGERY | Facility: MEDICAL CENTER | Age: 71
End: 2021-09-29
Payer: MEDICARE

## 2021-09-29 ENCOUNTER — ANESTHESIA EVENT (OUTPATIENT)
Dept: SURGERY | Facility: MEDICAL CENTER | Age: 71
End: 2021-09-29
Payer: MEDICARE

## 2021-09-29 ENCOUNTER — HOSPITAL ENCOUNTER (OUTPATIENT)
Facility: MEDICAL CENTER | Age: 71
End: 2021-09-29
Attending: STUDENT IN AN ORGANIZED HEALTH CARE EDUCATION/TRAINING PROGRAM | Admitting: STUDENT IN AN ORGANIZED HEALTH CARE EDUCATION/TRAINING PROGRAM
Payer: MEDICARE

## 2021-09-29 VITALS
TEMPERATURE: 97.3 F | DIASTOLIC BLOOD PRESSURE: 71 MMHG | RESPIRATION RATE: 16 BRPM | SYSTOLIC BLOOD PRESSURE: 141 MMHG | OXYGEN SATURATION: 95 % | WEIGHT: 151.24 LBS | HEART RATE: 81 BPM | BODY MASS INDEX: 22.92 KG/M2 | HEIGHT: 68 IN

## 2021-09-29 LAB — PATHOLOGY CONSULT NOTE: NORMAL

## 2021-09-29 PROCEDURE — 58542 LSH W/T/O UT 250 G OR LESS: CPT | Performed by: STUDENT IN AN ORGANIZED HEALTH CARE EDUCATION/TRAINING PROGRAM

## 2021-09-29 PROCEDURE — 700111 HCHG RX REV CODE 636 W/ 250 OVERRIDE (IP): Performed by: ANESTHESIOLOGY

## 2021-09-29 PROCEDURE — A9270 NON-COVERED ITEM OR SERVICE: HCPCS | Performed by: ANESTHESIOLOGY

## 2021-09-29 PROCEDURE — 700101 HCHG RX REV CODE 250: Performed by: STUDENT IN AN ORGANIZED HEALTH CARE EDUCATION/TRAINING PROGRAM

## 2021-09-29 PROCEDURE — 500002 HCHG ADHESIVE, DERMABOND: Performed by: STUDENT IN AN ORGANIZED HEALTH CARE EDUCATION/TRAINING PROGRAM

## 2021-09-29 PROCEDURE — 700105 HCHG RX REV CODE 258: Performed by: ANESTHESIOLOGY

## 2021-09-29 PROCEDURE — 160031 HCHG SURGERY MINUTES - 1ST 30 MINS LEVEL 5: Performed by: STUDENT IN AN ORGANIZED HEALTH CARE EDUCATION/TRAINING PROGRAM

## 2021-09-29 PROCEDURE — 160009 HCHG ANES TIME/MIN: Performed by: STUDENT IN AN ORGANIZED HEALTH CARE EDUCATION/TRAINING PROGRAM

## 2021-09-29 PROCEDURE — 160042 HCHG SURGERY MINUTES - EA ADDL 1 MIN LEVEL 5: Performed by: STUDENT IN AN ORGANIZED HEALTH CARE EDUCATION/TRAINING PROGRAM

## 2021-09-29 PROCEDURE — 502648 HCHG APPLICATOR, EVICEL: Performed by: STUDENT IN AN ORGANIZED HEALTH CARE EDUCATION/TRAINING PROGRAM

## 2021-09-29 PROCEDURE — 160036 HCHG PACU - EA ADDL 30 MINS PHASE I: Performed by: STUDENT IN AN ORGANIZED HEALTH CARE EDUCATION/TRAINING PROGRAM

## 2021-09-29 PROCEDURE — 502714 HCHG ROBOTIC SURGERY SERVICES: Performed by: STUDENT IN AN ORGANIZED HEALTH CARE EDUCATION/TRAINING PROGRAM

## 2021-09-29 PROCEDURE — 501568 HCHG TROCAR, BLUNTPORT 12MM: Performed by: STUDENT IN AN ORGANIZED HEALTH CARE EDUCATION/TRAINING PROGRAM

## 2021-09-29 PROCEDURE — 160035 HCHG PACU - 1ST 60 MINS PHASE I: Performed by: STUDENT IN AN ORGANIZED HEALTH CARE EDUCATION/TRAINING PROGRAM

## 2021-09-29 PROCEDURE — 501330 HCHG SET, CYSTO IRRIG TUBING: Performed by: STUDENT IN AN ORGANIZED HEALTH CARE EDUCATION/TRAINING PROGRAM

## 2021-09-29 PROCEDURE — 160025 RECOVERY II MINUTES (STATS): Performed by: STUDENT IN AN ORGANIZED HEALTH CARE EDUCATION/TRAINING PROGRAM

## 2021-09-29 PROCEDURE — 501838 HCHG SUTURE GENERAL: Performed by: STUDENT IN AN ORGANIZED HEALTH CARE EDUCATION/TRAINING PROGRAM

## 2021-09-29 PROCEDURE — A9270 NON-COVERED ITEM OR SERVICE: HCPCS | Performed by: STUDENT IN AN ORGANIZED HEALTH CARE EDUCATION/TRAINING PROGRAM

## 2021-09-29 PROCEDURE — 88305 TISSUE EXAM BY PATHOLOGIST: CPT

## 2021-09-29 PROCEDURE — 160002 HCHG RECOVERY MINUTES (STAT): Performed by: STUDENT IN AN ORGANIZED HEALTH CARE EDUCATION/TRAINING PROGRAM

## 2021-09-29 PROCEDURE — C1781 MESH (IMPLANTABLE): HCPCS | Performed by: STUDENT IN AN ORGANIZED HEALTH CARE EDUCATION/TRAINING PROGRAM

## 2021-09-29 PROCEDURE — 700111 HCHG RX REV CODE 636 W/ 250 OVERRIDE (IP): Performed by: STUDENT IN AN ORGANIZED HEALTH CARE EDUCATION/TRAINING PROGRAM

## 2021-09-29 PROCEDURE — 700101 HCHG RX REV CODE 250: Performed by: ANESTHESIOLOGY

## 2021-09-29 PROCEDURE — 160048 HCHG OR STATISTICAL LEVEL 1-5: Performed by: STUDENT IN AN ORGANIZED HEALTH CARE EDUCATION/TRAINING PROGRAM

## 2021-09-29 PROCEDURE — 57425 LAPAROSCOPY SURG COLPOPEXY: CPT | Performed by: STUDENT IN AN ORGANIZED HEALTH CARE EDUCATION/TRAINING PROGRAM

## 2021-09-29 PROCEDURE — 700102 HCHG RX REV CODE 250 W/ 637 OVERRIDE(OP): Performed by: STUDENT IN AN ORGANIZED HEALTH CARE EDUCATION/TRAINING PROGRAM

## 2021-09-29 PROCEDURE — 160046 HCHG PACU - 1ST 60 MINS PHASE II: Performed by: STUDENT IN AN ORGANIZED HEALTH CARE EDUCATION/TRAINING PROGRAM

## 2021-09-29 PROCEDURE — 700102 HCHG RX REV CODE 250 W/ 637 OVERRIDE(OP): Performed by: ANESTHESIOLOGY

## 2021-09-29 PROCEDURE — 110454 HCHG SHELL REV 250: Performed by: STUDENT IN AN ORGANIZED HEALTH CARE EDUCATION/TRAINING PROGRAM

## 2021-09-29 DEVICE — MESH RESTORELLE Y 24 X 4 CM: Type: IMPLANTABLE DEVICE | Site: VAGINA | Status: FUNCTIONAL

## 2021-09-29 RX ORDER — LIDOCAINE HYDROCHLORIDE 20 MG/ML
INJECTION, SOLUTION EPIDURAL; INFILTRATION; INTRACAUDAL; PERINEURAL PRN
Status: DISCONTINUED | OUTPATIENT
Start: 2021-09-29 | End: 2021-09-29 | Stop reason: SURG

## 2021-09-29 RX ORDER — OXYCODONE HCL 5 MG/5 ML
5 SOLUTION, ORAL ORAL
Status: COMPLETED | OUTPATIENT
Start: 2021-09-29 | End: 2021-09-29

## 2021-09-29 RX ORDER — HYDROMORPHONE HYDROCHLORIDE 1 MG/ML
0.1 INJECTION, SOLUTION INTRAMUSCULAR; INTRAVENOUS; SUBCUTANEOUS
Status: DISCONTINUED | OUTPATIENT
Start: 2021-09-29 | End: 2021-09-29 | Stop reason: HOSPADM

## 2021-09-29 RX ORDER — CEFOTETAN DISODIUM 2 G/20ML
INJECTION, POWDER, FOR SOLUTION INTRAMUSCULAR; INTRAVENOUS PRN
Status: DISCONTINUED | OUTPATIENT
Start: 2021-09-29 | End: 2021-09-29 | Stop reason: SURG

## 2021-09-29 RX ORDER — ONDANSETRON 2 MG/ML
4 INJECTION INTRAMUSCULAR; INTRAVENOUS EVERY 6 HOURS PRN
Status: DISCONTINUED | OUTPATIENT
Start: 2021-09-29 | End: 2021-09-29 | Stop reason: HOSPADM

## 2021-09-29 RX ORDER — HALOPERIDOL 5 MG/ML
1 INJECTION INTRAMUSCULAR
Status: DISCONTINUED | OUTPATIENT
Start: 2021-09-29 | End: 2021-09-29 | Stop reason: HOSPADM

## 2021-09-29 RX ORDER — OXYCODONE HCL 5 MG/5 ML
10 SOLUTION, ORAL ORAL
Status: COMPLETED | OUTPATIENT
Start: 2021-09-29 | End: 2021-09-29

## 2021-09-29 RX ORDER — PHENAZOPYRIDINE HYDROCHLORIDE 200 MG/1
200 TABLET, FILM COATED ORAL ONCE
Status: COMPLETED | OUTPATIENT
Start: 2021-09-29 | End: 2021-09-29

## 2021-09-29 RX ORDER — ONDANSETRON 2 MG/ML
4 INJECTION INTRAMUSCULAR; INTRAVENOUS
Status: DISCONTINUED | OUTPATIENT
Start: 2021-09-29 | End: 2021-09-29 | Stop reason: HOSPADM

## 2021-09-29 RX ORDER — DIPHENHYDRAMINE HYDROCHLORIDE 50 MG/ML
12.5 INJECTION INTRAMUSCULAR; INTRAVENOUS
Status: DISCONTINUED | OUTPATIENT
Start: 2021-09-29 | End: 2021-09-29 | Stop reason: HOSPADM

## 2021-09-29 RX ORDER — SODIUM CHLORIDE, SODIUM LACTATE, POTASSIUM CHLORIDE, CALCIUM CHLORIDE 600; 310; 30; 20 MG/100ML; MG/100ML; MG/100ML; MG/100ML
INJECTION, SOLUTION INTRAVENOUS
Status: DISCONTINUED | OUTPATIENT
Start: 2021-09-29 | End: 2021-09-29 | Stop reason: SURG

## 2021-09-29 RX ORDER — HYDRALAZINE HYDROCHLORIDE 20 MG/ML
5 INJECTION INTRAMUSCULAR; INTRAVENOUS
Status: DISCONTINUED | OUTPATIENT
Start: 2021-09-29 | End: 2021-09-29 | Stop reason: HOSPADM

## 2021-09-29 RX ORDER — BUPIVACAINE HYDROCHLORIDE 2.5 MG/ML
INJECTION, SOLUTION EPIDURAL; INFILTRATION; INTRACAUDAL
Status: DISCONTINUED | OUTPATIENT
Start: 2021-09-29 | End: 2021-09-29 | Stop reason: HOSPADM

## 2021-09-29 RX ORDER — HYDROMORPHONE HYDROCHLORIDE 1 MG/ML
0.2 INJECTION, SOLUTION INTRAMUSCULAR; INTRAVENOUS; SUBCUTANEOUS
Status: DISCONTINUED | OUTPATIENT
Start: 2021-09-29 | End: 2021-09-29 | Stop reason: HOSPADM

## 2021-09-29 RX ORDER — HYDROMORPHONE HYDROCHLORIDE 1 MG/ML
0.4 INJECTION, SOLUTION INTRAMUSCULAR; INTRAVENOUS; SUBCUTANEOUS
Status: DISCONTINUED | OUTPATIENT
Start: 2021-09-29 | End: 2021-09-29 | Stop reason: HOSPADM

## 2021-09-29 RX ORDER — MIDAZOLAM HYDROCHLORIDE 1 MG/ML
INJECTION INTRAMUSCULAR; INTRAVENOUS PRN
Status: DISCONTINUED | OUTPATIENT
Start: 2021-09-29 | End: 2021-09-29 | Stop reason: SURG

## 2021-09-29 RX ORDER — DEXAMETHASONE SODIUM PHOSPHATE 4 MG/ML
INJECTION, SOLUTION INTRA-ARTICULAR; INTRALESIONAL; INTRAMUSCULAR; INTRAVENOUS; SOFT TISSUE PRN
Status: DISCONTINUED | OUTPATIENT
Start: 2021-09-29 | End: 2021-09-29 | Stop reason: SURG

## 2021-09-29 RX ORDER — BENAZEPRIL HYDROCHLORIDE 40 MG/1
40 TABLET ORAL DAILY
COMMUNITY

## 2021-09-29 RX ORDER — SCOLOPAMINE TRANSDERMAL SYSTEM 1 MG/1
1 PATCH, EXTENDED RELEASE TRANSDERMAL ONCE
Status: DISCONTINUED | OUTPATIENT
Start: 2021-09-29 | End: 2021-09-29 | Stop reason: HOSPADM

## 2021-09-29 RX ORDER — LABETALOL HYDROCHLORIDE 5 MG/ML
5 INJECTION, SOLUTION INTRAVENOUS
Status: DISCONTINUED | OUTPATIENT
Start: 2021-09-29 | End: 2021-09-29 | Stop reason: HOSPADM

## 2021-09-29 RX ORDER — ONDANSETRON 2 MG/ML
INJECTION INTRAMUSCULAR; INTRAVENOUS PRN
Status: DISCONTINUED | OUTPATIENT
Start: 2021-09-29 | End: 2021-09-29 | Stop reason: SURG

## 2021-09-29 RX ORDER — ROCURONIUM BROMIDE 10 MG/ML
INJECTION, SOLUTION INTRAVENOUS PRN
Status: DISCONTINUED | OUTPATIENT
Start: 2021-09-29 | End: 2021-09-29 | Stop reason: SURG

## 2021-09-29 RX ORDER — KETOROLAC TROMETHAMINE 30 MG/ML
INJECTION, SOLUTION INTRAMUSCULAR; INTRAVENOUS PRN
Status: DISCONTINUED | OUTPATIENT
Start: 2021-09-29 | End: 2021-09-29 | Stop reason: SURG

## 2021-09-29 RX ORDER — ACETAMINOPHEN 500 MG
1000 TABLET ORAL ONCE
Status: COMPLETED | OUTPATIENT
Start: 2021-09-29 | End: 2021-09-29

## 2021-09-29 RX ORDER — MEPERIDINE HYDROCHLORIDE 25 MG/ML
6.25 INJECTION INTRAMUSCULAR; INTRAVENOUS; SUBCUTANEOUS
Status: DISCONTINUED | OUTPATIENT
Start: 2021-09-29 | End: 2021-09-29 | Stop reason: HOSPADM

## 2021-09-29 RX ADMIN — FENTANYL CITRATE 25 MCG: 50 INJECTION INTRAMUSCULAR; INTRAVENOUS at 16:48

## 2021-09-29 RX ADMIN — SCOPALAMINE 1 PATCH: 1 PATCH, EXTENDED RELEASE TRANSDERMAL at 11:36

## 2021-09-29 RX ADMIN — OXYCODONE HYDROCHLORIDE 5 MG: 5 SOLUTION ORAL at 16:12

## 2021-09-29 RX ADMIN — LIDOCAINE HYDROCHLORIDE 20 MG: 20 INJECTION, SOLUTION EPIDURAL; INFILTRATION; INTRACAUDAL at 12:15

## 2021-09-29 RX ADMIN — SODIUM CHLORIDE, POTASSIUM CHLORIDE, SODIUM LACTATE AND CALCIUM CHLORIDE: 600; 310; 30; 20 INJECTION, SOLUTION INTRAVENOUS at 12:10

## 2021-09-29 RX ADMIN — LIDOCAINE HYDROCHLORIDE 0.5 ML: 10 INJECTION, SOLUTION EPIDURAL; INFILTRATION; INTRACAUDAL; PERINEURAL at 10:59

## 2021-09-29 RX ADMIN — MIDAZOLAM HYDROCHLORIDE 2 MG: 1 INJECTION, SOLUTION INTRAMUSCULAR; INTRAVENOUS at 12:10

## 2021-09-29 RX ADMIN — ROCURONIUM BROMIDE 20 MG: 10 INJECTION, SOLUTION INTRAVENOUS at 13:27

## 2021-09-29 RX ADMIN — PHENAZOPYRIDINE HYDROCHLORIDE 200 MG: 200 TABLET ORAL at 11:52

## 2021-09-29 RX ADMIN — FENTANYL CITRATE 25 MCG: 50 INJECTION INTRAMUSCULAR; INTRAVENOUS at 16:35

## 2021-09-29 RX ADMIN — ROCURONIUM BROMIDE 20 MG: 10 INJECTION, SOLUTION INTRAVENOUS at 14:01

## 2021-09-29 RX ADMIN — FENTANYL CITRATE 50 MCG: 50 INJECTION, SOLUTION INTRAMUSCULAR; INTRAVENOUS at 12:10

## 2021-09-29 RX ADMIN — DEXAMETHASONE SODIUM PHOSPHATE 4 MG: 4 INJECTION, SOLUTION INTRA-ARTICULAR; INTRALESIONAL; INTRAMUSCULAR; INTRAVENOUS; SOFT TISSUE at 12:19

## 2021-09-29 RX ADMIN — FENTANYL CITRATE 50 MCG: 50 INJECTION, SOLUTION INTRAMUSCULAR; INTRAVENOUS at 14:01

## 2021-09-29 RX ADMIN — FENTANYL CITRATE 50 MCG: 50 INJECTION, SOLUTION INTRAMUSCULAR; INTRAVENOUS at 12:36

## 2021-09-29 RX ADMIN — SUGAMMADEX 150 MG: 100 INJECTION, SOLUTION INTRAVENOUS at 15:15

## 2021-09-29 RX ADMIN — ROCURONIUM BROMIDE 20 MG: 10 INJECTION, SOLUTION INTRAVENOUS at 12:48

## 2021-09-29 RX ADMIN — CEFOTETAN DISODIUM 2 G: 2 INJECTION, POWDER, FOR SOLUTION INTRAMUSCULAR; INTRAVENOUS at 12:24

## 2021-09-29 RX ADMIN — ONDANSETRON 4 MG: 2 INJECTION INTRAMUSCULAR; INTRAVENOUS at 14:53

## 2021-09-29 RX ADMIN — FENTANYL CITRATE 50 MCG: 50 INJECTION, SOLUTION INTRAMUSCULAR; INTRAVENOUS at 15:16

## 2021-09-29 RX ADMIN — PROPOFOL 150 MG: 10 INJECTION, EMULSION INTRAVENOUS at 12:15

## 2021-09-29 RX ADMIN — ACETAMINOPHEN 1000 MG: 500 TABLET ORAL at 11:36

## 2021-09-29 RX ADMIN — FENTANYL CITRATE 50 MCG: 50 INJECTION, SOLUTION INTRAMUSCULAR; INTRAVENOUS at 13:27

## 2021-09-29 RX ADMIN — ROCURONIUM BROMIDE 40 MG: 10 INJECTION, SOLUTION INTRAVENOUS at 12:16

## 2021-09-29 RX ADMIN — FENTANYL CITRATE 50 MCG: 50 INJECTION, SOLUTION INTRAMUSCULAR; INTRAVENOUS at 14:46

## 2021-09-29 RX ADMIN — KETOROLAC TROMETHAMINE 30 MG: 30 INJECTION, SOLUTION INTRAMUSCULAR at 14:59

## 2021-09-29 ASSESSMENT — PAIN DESCRIPTION - PAIN TYPE
TYPE: SURGICAL PAIN

## 2021-09-29 ASSESSMENT — PAIN SCALES - GENERAL: PAIN_LEVEL: 2

## 2021-09-29 NOTE — ANESTHESIA TIME REPORT
Anesthesia Start and Stop Event Times     Date Time Event    9/29/2021 11:35 AM Ready for Procedure    9/29/2021 12:10 PM Anesthesia Start    9/29/2021 03:42 PM Anesthesia Stop        Responsible Staff  09/29/21    Name Role Begin End    Tara To M.D. Anesthesiologist 09/29/21 12:10 PM 09/29/21 03:42 PM        Preop Diagnosis (Free Text):  Pre-op Diagnosis     INCOMPLETE UTEROVAGINAL PROLAPSE, CYSTOCELE MIDLINe        Preop Diagnosis (Codes):    Premium Reason  A. 3PM - 7AM    Comments:

## 2021-09-29 NOTE — ANESTHESIA POSTPROCEDURE EVALUATION
Patient: Akilah Landry    Procedure Summary     Date: 09/29/21 Room / Location: [place] [address] / SURGERY [facility]    Anesthesia Start: 1210 Anesthesia Stop: 1542    Procedures:       SACROCOLPOPEXY, ROBOT-ASSISTED, USING DA AMRIT XI (Abdomen)      HYSTERECTOMY,SUPRACERVICAL,ROBOT-ASSISTED,USING DA AMRIT XI (Abdomen)      SALPINGO-OOPHORECTOMY (Bilateral Abdomen)      CYSTOSCOPY - CYSTOURETHROSCOPY (Bladder) Diagnosis: (INCOMPLETE UTEROVAGINAL PROLAPSE, CYSTOCELE MIDLINe)    Surgeons: Preethi Amezcua M.D. Responsible Provider: Tara To M.D.    Anesthesia Type: general ASA Status: 2          Final Anesthesia Type: general  Last vitals  BP   Blood Pressure : 138/70    Temp   36.4 °C (97.5 °F)    Pulse   70   Resp   12    SpO2   98 %      Anesthesia Post Evaluation    Patient location during evaluation: PACU  Patient participation: complete - patient participated  Level of consciousness: awake and alert  Pain score: 2    Airway patency: patent  Anesthetic complications: no  Cardiovascular status: hemodynamically stable  Respiratory status: acceptable  Hydration status: euvolemic    PONV: none          No complications documented.

## 2021-09-29 NOTE — ANESTHESIA PREPROCEDURE EVALUATION
Relevant Problems   CARDIAC   (positive) HTN (hypertension)      GI   (positive) GERD (gastroesophageal reflux disease)       Physical Exam    Airway   Mallampati: II  TM distance: >3 FB  Neck ROM: full       Cardiovascular - normal exam  Rhythm: regular  Rate: normal  (-) murmur     Dental - normal exam           Pulmonary - normal exam  Breath sounds clear to auscultation     Abdominal    Neurological - normal exam                 Anesthesia Plan    ASA 2       Plan - general       Airway plan will be ETT        Plan Factors:   Patient was previously instructed to abstain from smoking on day of procedure.  Patient did not smoke on day of procedure.      Induction: intravenous    Postoperative Plan: Postoperative administration of opioids is intended.    Pertinent diagnostic labs and testing reviewed    Informed Consent:    Anesthetic plan and risks discussed with patient.    Use of blood products discussed with: patient whom.

## 2021-09-29 NOTE — ANESTHESIA PROCEDURE NOTES
Airway    Date/Time: 9/29/2021 12:18 PM  Performed by: Tara To M.D.  Authorized by: Tara To M.D.     Location:  OR  Urgency:  Elective  Indications for Airway Management:  Anesthesia      Spontaneous Ventilation: absent    Sedation Level:  Deep  Preoxygenated: Yes    Patient Position:  Sniffing  Mask Difficulty Assessment:  1 - vent by mask  Final Airway Type:  Endotracheal airway  Final Endotracheal Airway:  ETT  Cuffed: Yes    Technique Used for Successful ETT Placement:  Direct laryngoscopy    Insertion Site:  Oral  Blade Type:  Jade  Laryngoscope Blade/Videolaryngoscope Blade Size:  3  ETT Size (mm):  7.0  Measured from:  Teeth  ETT to Teeth (cm):  20  Placement Verified by: auscultation and capnometry    Cormack-Lehane Classification:  Grade I - full view of glottis  Number of Attempts at Approach:  1

## 2021-09-29 NOTE — OP REPORT
OPERATIVE REPORT    Patient: Akilah Landry    Date of Procedure: 9/29/2021    Procedure(s):    - Robotic-assisted laparoscopic supracervical hysterectomy, bilateral salpingo-oophorectomy (72157)  - Robotic-assisted laparoscopic sacrocervicopexy (96382)  - Cystourethrosocpy             Surgeon:   Preethi Amezcua M.D.    Assistants:   Rosy Stovall M.D.    Findings:    - Exam under anesthesia: Stage 3 prolapse, normal genital hiatus diameter and small rectocele. At the completion of the procedure there was excellent tricompartmental support, no sutures were palpated rectally.    - Laparoscopy: No entry injuries to viscera or vasculature. Normal appearing peritoneal cavity and liver edge without significant adhesions. Normal-appearing uterus, bilateral fallopian tubes and ovaries. Ureters visualized bilaterally through the procedure. Normal survey prior to closure   - Cystourethroscopy: Normal appearing urothelium without lesions, masses, sutures, or perforations. Ureteral orifices noted in the normal orthotopic position, with brisk jets of urine bilaterally the procedure was completed. Urethra was normal in appearance without evidence of stricture, perforation, or diverticulum.              Anesthesiologist/Type of Anesthesia:   Anesthesiologist: Tara To M.D./General    Specimens:  ID Type Source Tests Collected by Time Destination   A : UTERUS WITH BILATERAL TUBES AND OVARIES Tissue Uterus PATHOLOGY SPECIMEN Preethi Amezcua M.D. 9/29/2021 1504         Implants:   Implant Name Type Inv. Item Serial No.  Lot No. LRB No. Used Action   MESH RESTORELLE Y 24 X 4 CM Mesh MESH RESTORELLE Y 24 X 4 CM  COLOPLAST KHADRA [id number] N/A 1 Implanted             IVF: See anesthesia record    Estimated Blood Loss: 50 mL           Drain: None           Complications: None           Indications for Procedure:     Ms. Landry is a 71 year old with stage 3 anterior predominant uterovaginal prolapse. She was  counseled on all approaches to prolapse treatment including observation, pessary and surgery. She was counseled on all methods of surgical prolapse repair including vaginal and abdominal/laparoscopic reconstructive approaches, and obliterative approaches. She opts for durable reconstructive approch via hysterectomy and sacrocervicopexy. Pre-operative cystometrogram did not demonstrate occult stress incontinence with reduction of prolapse  and she was counseled against a concomitant midurethral sling. She was counseled on all risks including bleeding, infection, damage to surrounding organs including bladder, urethra, ureters, bowel, blood vessels and nerves, possible laparotomy or subsequent surgery, as well as risk of recurrent prolapse, new urinary incontinence, transient urinary retention requiring garsia catheterization, pain with intercourse, urogenital fistula, and recurrent prolapse. Both printed and verbal counseling were provided. All questions were answered and consent was signed and witnessed.     Description of Procedure:     I spoke with the patient in the preoperative holding area, where again risks, benefits, indications, and alternatives were reviewed and informed consent was obtained.  She was then transferred to the operative suite, where she was identified, procedure was confirmed.  She was placed in dorsal supine position, given general endotracheal anesthesia without difficulty.  She was then placed in a dorsal lithotomy position  in yellowfin stirrups with all pressure points padded.  She was prepared and draped in usual sterile fashion with arms tucked and all pressure points padded.  An appropriate time-out was performed, where patient was identified, procedure was confirmed, and the operative team was introduced.  It was also confirmed that patient did receive cefazolin 2 g IV for prophylaxis, and she also received DVT prophylaxis with sequential compression devices bilaterally throughout the  case.  At this time, exam under anesthesia revealed findings noted above.   A 16-Portuguese Gonzalez catheter was then placed in the patient's  bladder for drainage throughout the procedure.  The anterior lip of the cervix was visualized with a speculum and grasped with a single-toothed tenaculum. A V-care uterine manipulator was placed easily, and the balloon inflated. The handle was covered with a sterile towel.     At this time, attention was turned to the patient's abdomen. After infiltration with marcaine, a 12mm infraumbilical incision was made, and the subcutaneous tissue dissected to the level of the fascia, which was grasped with kocher clamps, elevated, and entered sharply using heavy Gage scissors. The peritoneum was then grasped with hemostats and entered sharply with Metzenbaum scissors. Digital evaluation noted a correct entry into the peritoneal cavity without any anterior abdominal wall adhesions noted. With an S-retractor delineating the entry path, the 12mm Judy balloon trocar was then advanced into the peritoneal cavity, which was insufflated to a pressure of 15mmHg. An 8mm robotic trocar was then placed through the Judy trocar in preparation for docking. The camera was inserted and a full survey was performed with the above findings noted. No entry injuries to bowl, omentum, mesentery or vasculature were visualized.  At this time, on the patient's right lateral side, approximately 8 cm lateral to the umbilicus, an 8 mm incision that was made after injection with marcaine, and an 8 mm robotic trocar was then advanced under direct visualization of the laparoscope without difficulty.  An 8mm robotic assist port was then placed laterally to this, under direct visualization by the laparoscope. The gas was then switched into Airseal mode. On the patient's left hand side, two additional robotic ports were placed at 8cm and 16cm lateral to the umbilicus after infiltration of Marcaine, under direct  visualization with the laparoscope. Excellent hemostasis was noted at all port sites. At this time, the patient was placed in steep Trendelenburg. The Mi-[employer] Xi robot was then brought in for docking, and instruments were placed under direct visualization.     We then proceeded with the robotic assisted laparoscopic supracervical hysterectomy and bilateral salpingo-oophorectomy in the following fashion: The right adnexa was visualized and the infundibulopelvic ligament was found to be remote from the peristalsing right ureter at the pelvic brim. The right IP ligament was then ligated with bipolar electrocautery and transected with the monopolar scissors with excellent hemostasis noted. Dissection was then carried down along the broad ligament to the round ligament which was cauterized and transected. The round ligament on medial traction, the posterior leaflet of the broad ligament was then dissected further down to the level of the internal cervical os, lateralizing the ureter away from the cervix. Attention was then turned anteriorly where the vesicouterine peritoneum was dissected away from the uterus to create a bladder flap, to the level below the uterine manipulator cup. Uterine vessels were then carefully skeletonized and cauterized and transected perpendicularly at the level of the internal cervical os with excellent hemostasis noted. Attention was then turned to the contralateral side of the uterus where an identical dissection was performed starting with the infundibulopelvic ligament, through the round ligament creating a bladder flap and finally cauterizing and transecting the uterine vessels. The cervix was then transected perpendicularly at the level of the internal os using monopolar electrocautery with excellent hemostasis noted. The uterus tubes and ovaries were then grasped and moved into the upper pelvis for future retrieval at the end of the case. All pedicles were then inspected and found to be  hemostatic at this time.    Attention was then turned to the robotic assisted laparoscopic sacral cervicopexy which proceed in the following fashion: The sigmoid colon was retracted medially with the third arm and the sacral promontory was visualized. The peritoneum overlying the sacral promontory was then lifted and entered sharply with cold scissors. Gentle blunt and sharp dissection was then carried down through presacral adipose tissue to expose the anterior longitudinal ligament of the sacrum. Minimal electrocautery was used and excellent hemostasis was noted. A retroperitoneal tunnel for future covering of the mesh was then created using the monopolar scissors traveling in the right pararectal space and exiting at a position just medial to the proximal right uterosacral ligament. Attention was then turned to the anterior dissection. The cervix was grasped with a robotic tenaculum and placed under tension and the bladder was further dissected away from the vagina through the avascular vesicovaginal space down to the level of the trigone, which is identified with gentle traction on the Gonzalez balloon. Attention was then turned posteriorly where again traction was placed on the cervix using the tenaculum, and the peritoneum was entered midway down the vagina entering the avascular retrorectal space and dissected down to the level of the perineal body with excellent hemostasis noted. The Restorelle Y-mesh, which had been previously trimmed to the patient's measurements, was then brought into the operative field, and affixed to the cervical stroma using 3 [place]-Hunter sutures anteriorly and 3 [place]-Hunter sutures posteriorly. Attention was then turned posteriorly where the remainder of the posterior leaf of the mesh was attached to the posterior vaginal muscularis using a running 3-0 stratfix PDS unidirectional barbed suture in a box pattern with excellent approximation of the mesh to the posterior vaginal tissue without  any bunching. Attention was then turned to the anterior leaflet which was also attached to the anterior vaginal muscularis using a running 3-0 stratafix PDS unidirectional barbed suture in a box pattern with excellent approximation of the mesh to the anterior vaginal tissue without any bunching. An additional 3 interrupted 2-0 vicryl were placed for further mesh approximation. The Y portion of the mesh was then brought through the previously created retroperitoneal tunnel to the level of the sacrum. The mesh was then tensioned robotically and I personally examined for correct tensioning with a vaginal examination. The Y-portion of the mesh was then affixed to the sacral promontory using 2 [place]-Hunter interrupted sutures. Excess mesh was then trimmed and removed from the operative field. Mild oozing was noted anteriorly and at the sacrum, and surgiflo hemostatic agent was applied with excellent hemoastasis noted.  The peritoneum overlying the sacrum was then closed using a 2-0 monocryl figure-of-eight suture x2. The remaining peritoneum was then reapproximated in anterior to posterior fashion using a 2-0 stratafix Monocryl suture, completely covering and retroperitonealized the mesh without visible defects that would allow for viscera to herniate.     Cystourethroscopy was then performed. The Gonzalez catheter was removed and a 70 degree cystoscope was placed into the bladder under direct visualization and the bladder was backfilled with sterile water. A 360 degree survey of the bladder was then performed with the above findings noted, no sutures, mesh or urothelial defects visualized, and excellent bilateral ureteral reflux with pyridium-stained urine. The bladder was drained of all cystoscopic fluid, the cystoscope removed, and the Gonzalez catheter replaced.    All instruments were then removed and the robot undocked. The specimen was placed into a 10 endocatch bag and brought through the umbilical incision, removed with  in-bag morcellation and sent for pathology. All trocars were then removed under direct visualization and the umbilical fascia was closed using a 0-vicryl running suture, and skin closed at all sites with 4-0 monocryl subcuticular suture and dermabond. Excellent hemostasis was noted.     All counts were correct.  The patient was then wakened from general anesthesia easily, and brought to the PACU in stable condition.       Disposition: to PACU, anticipate discharge home this evening.          9/29/2021 3:47 PM Preethi Amezcua M.D.

## 2021-09-29 NOTE — OR NURSING
1540: Pt arrives to PACU asleep and calm. VSS. 5 lap sites to abdomen all CDI. Ice pack applied.     1635: Pt c/o cramping like pain- medicated per MAR. Tolerating sips of water.    1640: Pts daughter called and updated.     1715: Pt c/o right eye irritation, eye was flushed with saline and no improvement. Dr. To notified and states to cover eye for this evening and pt should feel improvement tomorrow.     1740: Pt states cramping has improved and denies nausea. Report called to Alena HIGGINS. Incisions CDI.

## 2021-09-30 ENCOUNTER — TELEPHONE (OUTPATIENT)
Dept: OBGYN | Facility: CLINIC | Age: 71
End: 2021-09-30

## 2021-09-30 NOTE — TELEPHONE ENCOUNTER
Post-op checkup (call)    I called Ms. Landry and confirmed identity. She is POD#1 s/p Robotic ONEL/BSO, sacrocolpopexy, successful voiding trial, discharged home same day.     Pain control: 2/10, only requiring ibu/tylenol  Tolerating diet: yes  Nausea/vomiting: no  Passing gas: yes  Ambulating: yes  Voiding: yes    I reviewed with her again the findings from surgery and the general arc of recovery. All questions answered.     F/u in 6 weeks as scheduled, or PRN if any issues.       Preethi Amezcua MD, FACOG    Female Pelvic Medicine and Reconstructive Surgery  Department of Obstetrics and Gynecology  [facility]  [facility]

## 2021-09-30 NOTE — DISCHARGE INSTRUCTIONS
Post-op: What to expect after your surgery    For urgent post-operative questions or concerns, please call Dr. Amezcua's direct on-call cell line at [phone].     For less-urgent matters (Monday - Friday), you may send a message through [employer] or call the general Women's Health MA line at [phone] [phone].     Bladder function  Try to empty your bladder (urinate) at regular intervals by sitting on the toilet and relaxing.  You may need to adjust your positioning (lean forward or back) to empty the bladder fully. It is important that you do not push or strain to empty your bladder.     Call the surgeon at the number above if you cannot urinate. Also, call for treatment if you have signs and symptoms of a urinary tract infection, including:   • Burning with urination  • Bladder pain  • Worsening need to urinate right away  • Urine with a bad smell    If you are sent home with a catheter:   Empty the catheter bag when it becomes full. The bag should be kept at a level below your hips to drain properly. When you are asleep, the bag should dangle off the bed. and should dangle off of the bed while you are asleep. You will be called the day after you go home to schedule an office visit to test your bladder and remove the catheter.     Vaginal care:   Do not go swimming, take sitting baths, or have sexual intercourse for 6 weeks after surgery. Do not place anything in the vagina except vaginal estrogen cream, if instructed to do so by your surgeon.     Vaginal discharge and bleeding/spotting is also normal through the entire 6-week recovery. Sometimes small sutures will fall out of the vagina as they dissolve. This is normal. Contact the office with any heavy bleeding soaking through pads, bad-smelling discharge, or worsening pain.     Pain management:  Surgical pain is controlled in most patients with only non-steroidal anti-inflammatory drugs (NSAIDs, such as ibuprofen, “Advil”), and acetaminophen (Tylenol). These drugs  can be taken together without interaction. In the hospital, your nurse will give you these medications at regular intervals to both treat and to prevent pain. If these are not controlling your pain, you may ask the nurse for additional medication. When you go home, you will also take NSAIDs and acetaminophen for pain management. Sometimes, a short course of narcotics such as oxycodone and hydromorphone is are required. We do not recommend using narcotics regularly as it can lead to constipation or dizziness, and falls.     Abdominal wound care  The incisions on your abdomen will be closed with either small bandages or a surgical glue. There are also tiny dissolving sutures beneath the skin. You can shower with these in place. In the shower, let the soapy water run over your incisions. Do not scrub or wipe your incisions. Keep the incisions dry for the remainder of the day/night. The bandages will fall off on their own, or you can remove them after at least 3 days if they become discolored or dirty. The glue will also fall off on its own after a few weeks. Small sutures that pop out through the skin are normal and will dissolve over time.    Call us if you feel increasing pain, redness, discharge or warmth at the incision.     Bowel function  Constipation is common after surgery. This means it may take several days before having a normal bowel movement. It is important to take extra steps to keep your stools soft to avoid straining with bowel movements. Straining may damage the prolapse repair before it has healed. Most patients will be given a prescription for a  gentle laxative (Miralax or lactulose). This mediation adds water to the stool to make it easier to pass. Take miralax daily throughout your recovery. Hold for a day if you develop diarrhea.     Call us if you experience any repeated episodes of vomiting, worsening abdominal pain/bloating, or are unable to have a bowel movement for more than 3 days.      Activity restrictions  During the first 6 weeks avoid any type of heavy lifting that requires you to strain.  Gentle walking is good exercise. Start with about 10 minutes a day when you feel ready and build up gradually. Avoid repetitive squatting or bending at the waist.Avoid any fitness-type training, aerobics, etc. for at least 6 weeks after surgery. Generally, you will need 4-6 weeks off work. This period may be longer if you have a very physical job.    Return to sex  When your surgeon clears you to resume sex (if desired), begin slowly and to use enough lubrication to help ease the discomfort. Because your vagina and pelvis have been re-structured, and it can take time to get used to sex after surgery. As scar tissue softens over time you will feel less discomfort. If discomfort does not go away, contact the office to schedule an exam and to discuss other options. In some cases, your surgeon may prescribe a low dose of vaginal estrogen to help with vaginal dryness and pain that may happen with sex.      ACTIVITY: Rest and take it easy for the first 24 hours.  A responsible adult is recommended to remain with you during that time.  It is normal to feel sleepy.  We encourage you to not do anything that requires balance, judgment or coordination.    MILD FLU-LIKE SYMPTOMS ARE NORMAL. YOU MAY EXPERIENCE GENERALIZED MUSCLE ACHES, THROAT IRRITATION, HEADACHE AND/OR SOME NAUSEA.    FOR 24 HOURS DO NOT:  Drive, operate machinery or run household appliances.  Drink beer or alcoholic beverages.   Make important decisions or sign legal documents.    You should CALL YOUR PHYSICIAN if you develop:  Fever greater than 101 degrees F.  Pain not relieved by medication, or persistent nausea or vomiting.  Excessive bleeding (blood soaking through dressing) or unexpected drainage from the wound.  Extreme redness or swelling around the incision site, drainage of pus or foul smelling drainage.  Inability to urinate or empty  your bladder within 8 hours.  Problems with breathing or chest pain.    You should call 911 if you develop problems with breathing or chest pain.    If any questions arise, call your doctor.  If your doctor is not available, please feel free to call the Surgical Center at [phone]. The Contact Center is open Monday through Friday 7AM to 5PM and may speak to a nurse at [phone], or toll free at [phone].     A registered nurse may call you a few days after your surgery to see how you are doing after your procedure.    MEDICATIONS: Resume taking daily medication.  Take prescribed pain medication with food.  If no medication is prescribed, you may take non-aspirin pain medication if needed.  PAIN MEDICATION CAN BE VERY CONSTIPATING.  Take a stool softener or laxative such as senokot, pericolace, or milk of magnesia if needed.    Last pain medication given at 4:15pm.    If your physician has prescribed pain medication that includes Acetaminophen (Tylenol), do not take additional Acetaminophen (Tylenol) while taking the prescribed medication.    Depression / Suicide Risk    As you are discharged from this [facility] facility, it is important to learn how to keep safe from harming yourself.    Recognize the warning signs:  · Abrupt changes in personality, positive or negative- including increase in energy   · Giving away possessions  · Change in eating patterns- significant weight changes-  positive or negative  · Change in sleeping patterns- unable to sleep or sleeping all the time   · Unwillingness or inability to communicate  · Depression  · Unusual sadness, discouragement and loneliness  · Talk of wanting to die  · Neglect of personal appearance   · Rebelliousness- reckless behavior  · Withdrawal from people/activities they love  · Confusion- inability to concentrate     If you or a loved one observes any of these behaviors or has concerns about self-harm, here's what you can do:  · Talk about it-  your feelings and reasons for harming yourself  · Remove any means that you might use to hurt yourself (examples: pills, rope, extension cords, firearm)  · Get professional help from the community (Mental Health, Substance Abuse, psychological counseling)  · Do not be alone:Call your Safe Contact- someone whom you trust who will be there for you.  · Call your local CRISIS HOTLINE 078-7437 or 484-105-2059  · Call your local Children's Mobile Crisis Response Team [place] (390) 803-6731 or www.[employer]  · Call the toll free National Suicide Prevention Hotlines   · National Suicide Prevention Lifeline 122-736-VRGX (5010)  · National Hope Line Network 800-SUICIDE (494-0214)

## 2021-09-30 NOTE — OR NURSING
VSS, pt steady with ambulation, meets discharge criteria. Patient was able to void 300ml after instilling 300ml backfilled, easily. Discharged home with daughter. Wheeled to car with hospital escort. [place] CDI. IV dc'd, cathlon intact. Pt to f/u with physician as directed by physician. Pt to return to ER for any emergent sx. Pt verbalizes understanding of discharge instructions and all questions were answered.

## 2021-11-08 ENCOUNTER — GYNECOLOGY VISIT (OUTPATIENT)
Dept: OBGYN | Facility: CLINIC | Age: 71
End: 2021-11-08
Payer: MEDICARE

## 2021-11-08 VITALS
DIASTOLIC BLOOD PRESSURE: 74 MMHG | HEIGHT: 67 IN | SYSTOLIC BLOOD PRESSURE: 124 MMHG | BODY MASS INDEX: 24.64 KG/M2 | HEART RATE: 66 BPM | WEIGHT: 157 LBS

## 2021-11-08 DIAGNOSIS — Z98.890 POST-OPERATIVE STATE: ICD-10-CM

## 2021-11-08 DIAGNOSIS — N95.2 ATROPHIC VAGINITIS: ICD-10-CM

## 2021-11-08 PROBLEM — N81.2 INCOMPLETE UTEROVAGINAL PROLAPSE: Status: RESOLVED | Noted: 2021-09-09 | Resolved: 2021-11-08

## 2021-11-08 PROBLEM — N81.11 MIDLINE CYSTOCELE: Status: RESOLVED | Noted: 2021-09-09 | Resolved: 2021-11-08

## 2021-11-08 PROBLEM — N81.6 RECTOCELE, FEMALE: Status: RESOLVED | Noted: 2021-09-09 | Resolved: 2021-11-08

## 2021-11-08 LAB
APPEARANCE UR: CLEAR
BILIRUB UR STRIP-MCNC: NORMAL MG/DL
COLOR UR AUTO: NORMAL
GLUCOSE UR STRIP.AUTO-MCNC: NEGATIVE MG/DL
KETONES UR STRIP.AUTO-MCNC: NORMAL MG/DL
LEUKOCYTE ESTERASE UR QL STRIP.AUTO: NORMAL
NITRITE UR QL STRIP.AUTO: NEGATIVE
PH UR STRIP.AUTO: 5.5 [PH] (ref 5–8)
PROT UR QL STRIP: NEGATIVE MG/DL
RBC UR QL AUTO: NEGATIVE
SP GR UR STRIP.AUTO: 1.02
UROBILINOGEN UR STRIP-MCNC: NORMAL MG/DL

## 2021-11-08 PROCEDURE — 99024 POSTOP FOLLOW-UP VISIT: CPT | Performed by: STUDENT IN AN ORGANIZED HEALTH CARE EDUCATION/TRAINING PROGRAM

## 2021-11-08 PROCEDURE — 81002 URINALYSIS NONAUTO W/O SCOPE: CPT | Performed by: STUDENT IN AN ORGANIZED HEALTH CARE EDUCATION/TRAINING PROGRAM

## 2021-11-08 NOTE — PROGRESS NOTES
Urogynecology and Pelvic Reconstructive Surgery Consultation Visit    Akilah Landry MRN:[id number]    Referred by: Dr. Jessie Sloan    Reason for Visit:   Chief Complaint   Patient presents with   • Other     Post Op         Subjective     History of Presenting Illness:    Ms.Janice Anika Landry is a 72yo who is 6 weeks s/p uncomplicated RaSCH/BSO, sacrocervicopexy, cysto. She reports doing well with no issues.     Pain: minimal  Vaginal bleeding: none  Bladder emptying: complete  Incontinence: none  Bowel function: normal          Prior Pelvic surgery:   Tubal ligation  : RaSCH/BSO, sacrocervicopexy, cysto ([facility])       Prior treatment:   No     Fluid intake:   4 8oz glasses water  Occasional juice - cranberry    Pelvic floor symptom review:     Bladder:   Voids per day: 3-5 Voids per night: 0-1      Urinary incontinence episodes per day: occasional    Urge leakage:  On Movement to Bathroom and Full Bladder   Stress leakage: None   Continuous / insensible urine loss: No    Nocturnal enuresis: No    Leakage volume: Drops   Number of pads/day: none    Bladder emptying: Complete   Voiding symptoms: None   UTI in last 12 months: No   Other urologic history: none      Prolapse:     Prolapse symptoms: Resolved     Bowel:    Constipation: Yes, dietary related   Bowel movements per day: 1    Straining to empty bowels: Yes   Splinting to evacuate: No    Painful evacuation: No    Difficulty emptying rectum: No    Incontinence to stool: No   Incontinence to gas: No     Blood in stool: No    Hemorrhoids: No    Bowel conditions: none   Most recent colonoscopy: 2018, return in 5 years       Sexual function:    Sexually active: No , however may be interested again if she meets someone   Gender of partners: Male   Pain with intercourse: No       Pelvic Pain: No      Past medical and surgical history    Past obstetric history   Number of vaginal deliveries: 2   Number of  deliveries: 0   History of  vacuum/forceps: No    History of obstetric anal sphincter injury: No     Past gynecological history:    Last menstrual period: Patient's last menstrual period was 01/01/2001.   History of abnormal uterine bleeding: No    History of fibroids: No    History of endometrial polyps:  No    History of endometriosis: No    History of cervical dysplasia: No    Last pap: 2015       Past medical history:  Past Medical History:   Diagnosis Date   • GERD (gastroesophageal reflux disease) 3/5/2015   • Trigger finger of right hand 3/5/2015   • Cataract     no surgery   • Heart burn    • Hypertension    • Indigestion      Past surgical history:  Past Surgical History:   Procedure Laterality Date   • PB LAPAROSCOPY, SURG, COLPOPEXY  9/29/2021    Procedure: SACROCOLPOPEXY, ROBOT-ASSISTED, USING DA AMRIT XI;  Surgeon: Preethi Amezcua M.D.;  Location: [facility];  Service: Gyn Robotic   • PB CYSTOURETHROSCOPY  9/29/2021    Procedure: CYSTOSCOPY - CYSTOURETHROSCOPY;  Surgeon: Preethi Amezcua M.D.;  Location: [facility];  Service: Gyn Robotic   • HYSTERECTOMY, SUPRACERVICAL, ROBOT-ASSISTED, USING DA VI  9/29/2021    Procedure: HYSTERECTOMY,SUPRACERVICAL,ROBOT-ASSISTED,USING DA AMRIT XI;  Surgeon: Preethi Amezcua M.D.;  Location: SURGERY [facility];  Service: Gyn Robotic   • SALPINGO OOPHORECTOMY Bilateral 9/29/2021    Procedure: SALPINGO-OOPHORECTOMY;  Surgeon: Preethi Amezcua M.D.;  Location: SURGERY [facility];  Service: Gyn Robotic   • TUBAL COAGULATION LAPAROSCOPIC BILATERAL     • WRIST ORIF      right     Medications:has a current medication list which includes the following prescription(s): benazepril, centrum silver 50+women, calcium carb-cholecalciferol, misc natural products, acetaminophen, ibuprofen, polyethylene glycol/lytes, estradiol, and omeprazole.  Allergies:Patient has no known allergies.  Family history:  Family History   Problem Relation Age of Onset   • Cancer Sister         breast   • Heart Disease  Mother    • Hypertension Mother    • Cancer Mother         breast   • Heart Disease Father         pacemaker   • Hypertension Father    • Hypertension Sister    • Cancer Maternal Grandfather    • Heart Disease Maternal Grandfather      Social history: reports that she has quit smoking. Her smoking use included cigarettes. She has a 0.40 pack-year smoking history. She has never used smokeless tobacco. She reports current alcohol use. She reports that she does not use drugs.         Objective        LMP 01/01/2001     Physical Exam  Vitals reviewed. Exam conducted with a chaperone present.   Constitutional:       Appearance: Normal appearance.   HENT:      Head: Normocephalic.      Mouth/Throat:      Mouth: Mucous membranes are moist.   Cardiovascular:      Rate and Rhythm: Normal rate.   Pulmonary:      Effort: Pulmonary effort is normal.   Abdominal:      General: There is no distension.      Palpations: Abdomen is soft. There is no mass.      Tenderness: There is no abdominal tenderness.      Comments: Laparoscopic incisions c/d/i, well-healed, no induration, erythema, discharge   Skin:     General: Skin is warm and dry.   Neurological:      Mental Status: She is alert.   Psychiatric:         Mood and Affect: Mood normal.         Genitourinary:    External female genitalia: WNL   Vulva: WNL   Bulbocavernosus reflex: Intact   Anal wink reflex: Intact   Perineal sensation: Absent   Urethra: WNL   Vagina: Atrophic   Atrophy: Mild   Cough stress test: Negative    Pelvic floor (post-operative):    POP-Q: Aa -2 / Ba -2 / TVL 10 / C -8 / D 9.5 / Ap -2 / Bp -2 / GH 3  / PB 2 /     Prolapse stage: 1    Paravaginal defect: none   Urethral tenderness: No    Bladder/ suprapubic tenderness: No    Levator tenderness: None   Levator muscle tone: WNL   Palpable anal sphincter defect: No    Granulation tissue: No     Mesh/suture exposure: no   Palpable mesh: no   Epithelial erosions: No    Epithelial ulcerations: No    Fistula:  None   Vaginal band/stricture: No     Procedure Performed:  cystometrogram (see separate note)             Assessment & Plan     Ms.Janice Anika Landry is a 71 y.o. year old P2 s/p robotic ONEL/BSO, sacrocolpopexy. Excellent tricompartmental support, meeting all postoperative milestones.     1. Post-operative checkup  Doing very well   - May return to normal activities without restrictions  - Counseled on return to sexual activity, if desired    4. Atrophic vaginitis  Continue vaginal estrogen 1-2 times per week for maintenance    Follow up in 3 months            Preethi Amezcua MD, FACOG    Female Pelvic Medicine and Reconstructive Surgery  Department of Obstetrics and Gynecology  [facility]      This medical record contains text that has been entered with the assistance of computer voice recognition and dictation software.  Therefore, it may contain unintended errors in text, spelling, punctuation, or grammar

## 2021-11-08 NOTE — NON-PROVIDER
Patient here for Post Op Exam  Surgery Date:  09/29/2021  Type Of Surgery: RA Sacrocolpopexy   PT States she feels good since her surgery; Denies complications   Good #: [phone]   Bladder Scan: 0  Pharmacy Verified

## 2022-01-06 ENCOUNTER — APPOINTMENT (RX ONLY)
Dept: URBAN - METROPOLITAN AREA CLINIC 4 | Facility: CLINIC | Age: 72
Setting detail: DERMATOLOGY
End: 2022-01-06

## 2022-01-06 DIAGNOSIS — L90.5 SCAR CONDITIONS AND FIBROSIS OF SKIN: ICD-10-CM

## 2022-01-06 DIAGNOSIS — D485 NEOPLASM OF UNCERTAIN BEHAVIOR OF SKIN: ICD-10-CM

## 2022-01-06 DIAGNOSIS — L70.0 ACNE VULGARIS: ICD-10-CM

## 2022-01-06 DIAGNOSIS — L81.4 OTHER MELANIN HYPERPIGMENTATION: ICD-10-CM | Status: STABLE

## 2022-01-06 PROBLEM — D48.5 NEOPLASM OF UNCERTAIN BEHAVIOR OF SKIN: Status: ACTIVE | Noted: 2022-01-06

## 2022-01-06 PROCEDURE — ? COUNSELING

## 2022-01-06 PROCEDURE — ? REFERRAL CORRESPONDENCE

## 2022-01-06 PROCEDURE — 99213 OFFICE O/P EST LOW 20 MIN: CPT

## 2022-01-06 PROCEDURE — ? DIAGNOSIS COMMENT

## 2022-01-06 PROCEDURE — ? PRESCRIPTION

## 2022-01-06 RX ORDER — TRETIONIN 0.25 MG/G
1 CREAM TOPICAL QHS
Qty: 45 | Refills: 3 | Status: ERX | COMMUNITY
Start: 2022-01-06

## 2022-01-06 RX ADMIN — TRETIONIN 1: 0.25 CREAM TOPICAL at 00:00

## 2022-01-06 ASSESSMENT — LOCATION SIMPLE DESCRIPTION DERM
LOCATION SIMPLE: LEFT BUTTOCK
LOCATION SIMPLE: LEFT CALF
LOCATION SIMPLE: LEFT CHEEK

## 2022-01-06 ASSESSMENT — LOCATION DETAILED DESCRIPTION DERM
LOCATION DETAILED: LEFT BUTTOCK
LOCATION DETAILED: LEFT PROXIMAL CALF
LOCATION DETAILED: LEFT INFERIOR CENTRAL MALAR CHEEK

## 2022-01-06 ASSESSMENT — LOCATION ZONE DERM
LOCATION ZONE: TRUNK
LOCATION ZONE: LEG
LOCATION ZONE: FACE

## 2022-01-06 NOTE — PROCEDURE: COUNSELING
Detail Level: Generalized
Detail Level: Detailed
Topical Retinoid Pregnancy And Lactation Text: This medication is Pregnancy Category C. It is unknown if this medication is excreted in breast milk.
High Dose Vitamin A Counseling: Side effects reviewed, pt to contact office should one occur.
Azithromycin Counseling:  I discussed with the patient the risks of azithromycin including but not limited to GI upset, allergic reaction, drug rash, diarrhea, and yeast infections.
Sarecycline Counseling: Patient advised regarding possible photosensitivity and discoloration of the teeth, skin, lips, tongue and gums.  Patient instructed to avoid sunlight, if possible.  When exposed to sunlight, patients should wear protective clothing, sunglasses, and sunscreen.  The patient was instructed to call the office immediately if the following severe adverse effects occur:  hearing changes, easy bruising/bleeding, severe headache, or vision changes.  The patient verbalized understanding of the proper use and possible adverse effects of sarecycline.  All of the patient's questions and concerns were addressed.
Tetracycline Counseling: Patient counseled regarding possible photosensitivity and increased risk for sunburn.  Patient instructed to avoid sunlight, if possible.  When exposed to sunlight, patients should wear protective clothing, sunglasses, and sunscreen.  The patient was instructed to call the office immediately if the following severe adverse effects occur:  hearing changes, easy bruising/bleeding, severe headache, or vision changes.  The patient verbalized understanding of the proper use and possible adverse effects of tetracycline.  All of the patient's questions and concerns were addressed. Patient understands to avoid pregnancy while on therapy due to potential birth defects.
Doxycycline Pregnancy And Lactation Text: This medication is Pregnancy Category D and not consider safe during pregnancy. It is also excreted in breast milk but is considered safe for shorter treatment courses.
Isotretinoin Pregnancy And Lactation Text: This medication is Pregnancy Category X and is considered extremely dangerous during pregnancy. It is unknown if it is excreted in breast milk.
Topical Clindamycin Pregnancy And Lactation Text: This medication is Pregnancy Category B and is considered safe during pregnancy. It is unknown if it is excreted in breast milk.
Birth Control Pills Counseling: Birth Control Pill Counseling: I discussed with the patient the potential side effects of OCPs including but not limited to increased risk of stroke, heart attack, thrombophlebitis, deep venous thrombosis, hepatic adenomas, breast changes, GI upset, headaches, and depression.  The patient verbalized understanding of the proper use and possible adverse effects of OCPs. All of the patient's questions and concerns were addressed.
Include Pregnancy/Lactation Warning?: No
Dapsone Counseling: I discussed with the patient the risks of dapsone including but not limited to hemolytic anemia, agranulocytosis, rashes, methemoglobinemia, kidney failure, peripheral neuropathy, headaches, GI upset, and liver toxicity.  Patients who start dapsone require monitoring including baseline LFTs and weekly CBCs for the first month, then every month thereafter.  The patient verbalized understanding of the proper use and possible adverse effects of dapsone.  All of the patient's questions and concerns were addressed.
Benzoyl Peroxide Counseling: Patient counseled that medicine may cause skin irritation and bleach clothing.  In the event of skin irritation, the patient was advised to reduce the amount of the drug applied or use it less frequently.   The patient verbalized understanding of the proper use and possible adverse effects of benzoyl peroxide.  All of the patient's questions and concerns were addressed.
Detail Level: Zone
Tazorac Counseling:  Patient advised that medication is irritating and drying.  Patient may need to apply sparingly and wash off after an hour before eventually leaving it on overnight.  The patient verbalized understanding of the proper use and possible adverse effects of tazorac.  All of the patient's questions and concerns were addressed.
High Dose Vitamin A Pregnancy And Lactation Text: High dose vitamin A therapy is contraindicated during pregnancy and breast feeding.
Topical Sulfur Applications Counseling: Topical Sulfur Counseling: Patient counseled that this medication may cause skin irritation or allergic reactions.  In the event of skin irritation, the patient was advised to reduce the amount of the drug applied or use it less frequently.   The patient verbalized understanding of the proper use and possible adverse effects of topical sulfur application.  All of the patient's questions and concerns were addressed.
Azithromycin Pregnancy And Lactation Text: This medication is considered safe during pregnancy and is also secreted in breast milk.
Sarecycline Pregnancy And Lactation Text: This medication is Pregnancy Category D and not consider safe during pregnancy. It is also excreted in breast milk.
Birth Control Pills Pregnancy And Lactation Text: This medication should be avoided if pregnant and for the first 30 days post-partum.
Erythromycin Counseling:  I discussed with the patient the risks of erythromycin including but not limited to GI upset, allergic reaction, drug rash, diarrhea, increase in liver enzymes, and yeast infections.
Benzoyl Peroxide Pregnancy And Lactation Text: This medication is Pregnancy Category C. It is unknown if benzoyl peroxide is excreted in breast milk.
Bactrim Pregnancy And Lactation Text: This medication is Pregnancy Category D and is known to cause fetal risk.  It is also excreted in breast milk.
Minocycline Counseling: Patient advised regarding possible photosensitivity and discoloration of the teeth, skin, lips, tongue and gums.  Patient instructed to avoid sunlight, if possible.  When exposed to sunlight, patients should wear protective clothing, sunglasses, and sunscreen.  The patient was instructed to call the office immediately if the following severe adverse effects occur:  hearing changes, easy bruising/bleeding, severe headache, or vision changes.  The patient verbalized understanding of the proper use and possible adverse effects of minocycline.  All of the patient's questions and concerns were addressed.
Dapsone Pregnancy And Lactation Text: This medication is Pregnancy Category C and is not considered safe during pregnancy or breast feeding.
Erythromycin Pregnancy And Lactation Text: This medication is Pregnancy Category B and is considered safe during pregnancy. It is also excreted in breast milk.
Tazorac Pregnancy And Lactation Text: This medication is not safe during pregnancy. It is unknown if this medication is excreted in breast milk.
Bactrim Counseling:  I discussed with the patient the risks of sulfa antibiotics including but not limited to GI upset, allergic reaction, drug rash, diarrhea, dizziness, photosensitivity, and yeast infections.  Rarely, more serious reactions can occur including but not limited to aplastic anemia, agranulocytosis, methemoglobinemia, blood dyscrasias, liver or kidney failure, lung infiltrates or desquamative/blistering drug rashes.
Spironolactone Counseling: Patient advised regarding risks of diarrhea, abdominal pain, hyperkalemia, birth defects (for female patients), liver toxicity and renal toxicity. The patient may need blood work to monitor liver and kidney function and potassium levels while on therapy. The patient verbalized understanding of the proper use and possible adverse effects of spironolactone.  All of the patient's questions and concerns were addressed.
Topical Sulfur Applications Pregnancy And Lactation Text: This medication is Pregnancy Category C and has an unknown safety profile during pregnancy. It is unknown if this topical medication is excreted in breast milk.
Winlevi Pregnancy And Lactation Text: This medication is considered safe during pregnancy and breastfeeding.
Topical Retinoid counseling:  Patient advised to apply a pea-sized amount only at bedtime and wait 30 minutes after washing their face before applying.  If too drying, patient may add a non-comedogenic moisturizer. The patient verbalized understanding of the proper use and possible adverse effects of retinoids.  All of the patient's questions and concerns were addressed.
Doxycycline Counseling:  Patient counseled regarding possible photosensitivity and increased risk for sunburn.  Patient instructed to avoid sunlight, if possible.  When exposed to sunlight, patients should wear protective clothing, sunglasses, and sunscreen.  The patient was instructed to call the office immediately if the following severe adverse effects occur:  hearing changes, easy bruising/bleeding, severe headache, or vision changes.  The patient verbalized understanding of the proper use and possible adverse effects of doxycycline.  All of the patient's questions and concerns were addressed.
Topical Clindamycin Counseling: Patient counseled that this medication may cause skin irritation or allergic reactions.  In the event of skin irritation, the patient was advised to reduce the amount of the drug applied or use it less frequently.   The patient verbalized understanding of the proper use and possible adverse effects of clindamycin.  All of the patient's questions and concerns were addressed.
Spironolactone Pregnancy And Lactation Text: This medication can cause feminization of the male fetus and should be avoided during pregnancy. The active metabolite is also found in breast milk.
Winlevi Counseling:  I discussed with the patient the risks of topical clascoterone including but not limited to erythema, scaling, itching, and stinging. Patient voiced their understanding.
Isotretinoin Counseling: Patient should get monthly blood tests, not donate blood, not drive at night if vision affected, not share medication, and not undergo elective surgery for 6 months after tx completed. Side effects reviewed, pt to contact office should one occur.

## 2022-01-24 ENCOUNTER — OFFICE VISIT (OUTPATIENT)
Dept: URGENT CARE | Facility: PHYSICIAN GROUP | Age: 72
End: 2022-01-24
Payer: MEDICARE

## 2022-01-24 ENCOUNTER — HOSPITAL ENCOUNTER (OUTPATIENT)
Facility: MEDICAL CENTER | Age: 72
End: 2022-01-24
Attending: NURSE PRACTITIONER
Payer: MEDICARE

## 2022-01-24 VITALS
TEMPERATURE: 98.6 F | DIASTOLIC BLOOD PRESSURE: 72 MMHG | HEART RATE: 79 BPM | BODY MASS INDEX: 23.25 KG/M2 | HEIGHT: 68 IN | RESPIRATION RATE: 16 BRPM | SYSTOLIC BLOOD PRESSURE: 138 MMHG | WEIGHT: 153.4 LBS | OXYGEN SATURATION: 97 %

## 2022-01-24 DIAGNOSIS — J34.89 RHINORRHEA: ICD-10-CM

## 2022-01-24 DIAGNOSIS — R05.9 COUGH: ICD-10-CM

## 2022-01-24 DIAGNOSIS — G44.89 OTHER HEADACHE SYNDROME: ICD-10-CM

## 2022-01-24 DIAGNOSIS — R09.81 NASAL CONGESTION: ICD-10-CM

## 2022-01-24 DIAGNOSIS — J34.89 SINUS PRESSURE: ICD-10-CM

## 2022-01-24 DIAGNOSIS — J01.40 ACUTE NON-RECURRENT PANSINUSITIS: ICD-10-CM

## 2022-01-24 PROBLEM — N81.10 FEMALE CYSTOCELE: Status: ACTIVE | Noted: 2021-06-11

## 2022-01-24 PROCEDURE — U0005 INFEC AGEN DETEC AMPLI PROBE: HCPCS

## 2022-01-24 PROCEDURE — U0003 INFECTIOUS AGENT DETECTION BY NUCLEIC ACID (DNA OR RNA); SEVERE ACUTE RESPIRATORY SYNDROME CORONAVIRUS 2 (SARS-COV-2) (CORONAVIRUS DISEASE [COVID-19]), AMPLIFIED PROBE TECHNIQUE, MAKING USE OF HIGH THROUGHPUT TECHNOLOGIES AS DESCRIBED BY CMS-2020-01-R: HCPCS

## 2022-01-24 PROCEDURE — 99214 OFFICE O/P EST MOD 30 MIN: CPT | Performed by: NURSE PRACTITIONER

## 2022-01-24 RX ORDER — DOXYCYCLINE HYCLATE 100 MG
100 TABLET ORAL 2 TIMES DAILY
Qty: 14 TABLET | Refills: 0 | Status: SHIPPED | OUTPATIENT
Start: 2022-01-24 | End: 2022-01-31

## 2022-01-24 ASSESSMENT — ENCOUNTER SYMPTOMS
MUSCULOSKELETAL NEGATIVE: 1
PSYCHIATRIC NEGATIVE: 1
COUGH: 1
EYES NEGATIVE: 1
SORE THROAT: 1
SINUS PAIN: 1
CARDIOVASCULAR NEGATIVE: 1
NEUROLOGICAL NEGATIVE: 1
CONSTITUTIONAL NEGATIVE: 1
GASTROINTESTINAL NEGATIVE: 1

## 2022-01-24 NOTE — PROGRESS NOTES
Subjective:   Akilah Landry is a 71 y.o. female who presents for Sinus Problem (headahce,coughing, runny nose x12 days, )       HPI  Pt presents for evaluation of a new problem, reports 12-day history of headache, sinus pressure and pain, postnasal drip causing a cough, and clear runny nose.  Patient states that her phlegm/mucus is generally white to clear.  Patient has taken Zicam, throat lozenges, and homeopathic medications with some relief.  Patient denies known history of allergic rhinitis.  Patient denies any specific known ill contacts or exposures.    Review of Systems   Constitutional: Negative.    HENT: Positive for congestion, sinus pain and sore throat.    Eyes: Negative.    Respiratory: Positive for cough.    Cardiovascular: Negative.    Gastrointestinal: Negative.    Genitourinary: Negative.    Musculoskeletal: Negative.    Skin: Negative.    Neurological: Negative.    Psychiatric/Behavioral: Negative.    All other systems reviewed and are negative.      MEDS:   Current Outpatient Medications:   •  tretinoin (RETIN-A) 0.025 % cream, APPLY A PEA SIZE AMOUNT OF CREAM TOPICALLY AT BEDTIME EVERY OTHER NIGHT, THEN INCREASE AS TOLERATED, Disp: , Rfl:   •  benazepril (LOTENSIN) 40 MG tablet, Take 40 mg by mouth every day., Disp: , Rfl:   •  Multiple Vitamins-Minerals (CENTRUM SILVER 50+WOMEN) Tab, Take 1 Tablet by mouth every day., Disp: , Rfl:   •  Calcium Carb-Cholecalciferol (CALCIUM PLUS VITAMIN D3 PO), Take 1 Tablet by mouth every day., Disp: , Rfl:   •  Misc Natural Products (GLUCOSAMINE CHONDROITIN ADV PO), Take 1 Tablet by mouth every day., Disp: , Rfl:   •  acetaminophen (TYLENOL) 500 MG Tab, Take 1000mg (2 tab) three times daily for the first 7 days after surgery. After 7 days, you may take 500mg (1 tab) every 4 hours as needed for pain., Disp: 60 Tablet, Rfl: 1  •  ibuprofen (MOTRIN) 400 MG Tab, Take 800mg every 8 hours for the first 7 days after your surgery. After 7 days, you may take 400mg  "every 4-8 hours as needed for pain., Disp: 60 Tablet, Rfl: 1  •  polyethylene glycol/lytes (MIRALAX) 17 g Pack, Take 1 Packet by mouth every day. Take once every day to prevent constipation after surgery. Hold if you develop diarrhea, then resume the next day., Disp: 30 Each, Rfl: 1  •  estradiol (ESTRACE VAGINAL) 0.1 MG/GM vaginal cream, Apply 1g cream inside vagina nightly for 2 weeks, then twice per week thereafter, Disp: 1 Each, Rfl: 3  •  omeprazole (PRILOSEC) 20 MG delayed-release capsule, Take 1 Cap by mouth every day., Disp: 90 Cap, Rfl: 1  ALLERGIES: No Known Allergies    Patient's PMH, SocHx, SurgHx, FamHx, Drug allergies and medications were reviewed.     Objective:   /72 (BP Location: Right arm, Patient Position: Sitting, BP Cuff Size: Adult)   Pulse 79   Temp 37 °C (98.6 °F) (Temporal)   Resp 16   Ht 1.727 m (5' 8\")   Wt 69.6 kg (153 lb 6.4 oz)   LMP 01/01/2001   SpO2 97%   BMI 23.32 kg/m²     Physical Exam  Vitals and nursing note reviewed.   Constitutional:       General: She is awake.      Appearance: Normal appearance. She is well-developed and normal weight.   HENT:      Head: Normocephalic and atraumatic.      Right Ear: Tympanic membrane, ear canal and external ear normal.      Left Ear: Tympanic membrane, ear canal and external ear normal.      Nose: Congestion and rhinorrhea present.      Right Sinus: Maxillary sinus tenderness and frontal sinus tenderness present.      Left Sinus: Maxillary sinus tenderness and frontal sinus tenderness present.      Mouth/Throat:      Lips: Pink.      Mouth: Mucous membranes are moist.      Pharynx: Oropharynx is clear. Uvula midline.   Eyes:      Extraocular Movements: Extraocular movements intact.      Conjunctiva/sclera: Conjunctivae normal.      Pupils: Pupils are equal, round, and reactive to light.   Neck:      Thyroid: No thyromegaly.      Trachea: Trachea normal.   Cardiovascular:      Rate and Rhythm: Normal rate and regular rhythm.      " Pulses: Normal pulses.      Heart sounds: Normal heart sounds, S1 normal and S2 normal.   Pulmonary:      Effort: Pulmonary effort is normal. No respiratory distress.      Breath sounds: Normal breath sounds. No wheezing, rhonchi or rales.   Abdominal:      General: Bowel sounds are normal.      Palpations: Abdomen is soft.   Musculoskeletal:         General: Normal range of motion.      Cervical back: Full passive range of motion without pain, normal range of motion and neck supple.   Lymphadenopathy:      Cervical: No cervical adenopathy.   Skin:     General: Skin is warm and dry.      Capillary Refill: Capillary refill takes less than 2 seconds.   Neurological:      General: No focal deficit present.      Mental Status: She is alert and oriented to person, place, and time.      Gait: Gait is intact.   Psychiatric:         Attention and Perception: Attention and perception normal.         Mood and Affect: Mood normal.         Speech: Speech normal.         Behavior: Behavior normal. Behavior is cooperative.         Thought Content: Thought content normal.         Judgment: Judgment normal.         Assessment/Plan:   Assessment    1. Acute non-recurrent pansinusitis  - doxycycline (VIBRAMYCIN) 100 MG Tab; Take 1 Tablet by mouth 2 times a day for 7 days.  Dispense: 14 Tablet; Refill: 0    2. Nasal congestion  - SARS-CoV-2 PCR (24 hour In-House): Collect NP swab in VTM; Future    3. Cough  - SARS-CoV-2 PCR (24 hour In-House): Collect NP swab in VTM; Future    4. Other headache syndrome  - SARS-CoV-2 PCR (24 hour In-House): Collect NP swab in VTM; Future    5. Rhinorrhea  - SARS-CoV-2 PCR (24 hour In-House): Collect NP swab in VTM; Future    6. Sinus pressure  - SARS-CoV-2 PCR (24 hour In-House): Collect NP swab in VTM; Future      Vital signs stable at today's acute urgent care visit.  Discussed with patient that likely is viral in nature and her symptoms resolved within 2 to 3 days.  In the event that her symptoms  do not resolve, and/or patient's symptoms get worse, begin antibiotics as listed.  Antibiotic stewardship discussed with the patient.  Additionally, out of an abundance of caution, will check for Covid. Discussed management options (risks, benefits, and alternatives to treatment).     Advised the patient to follow-up with the primary care provider for recheck, reevaluation, and/or consideration of further management if necessary. Return to urgent care with any worsening symptoms or if there is no improvement in their current condition. Red flags discussed and indications to immediately call 911 or present to the ED.  All questions were encouraged and answered to the patient's satisfaction and understanding, and they agree to the plan of care.     I personally reviewed prior external notes and test results pertinent to today's visit.  I have independently reviewed and interpreted all diagnostics ordered during this urgent care acute visit. Time spent evaluating this patient was a minimum of 30 minutes and includes preparing for visit, counseling/education, exam, evaluation, obtaining history, and ordering lab/test/procedures.      Please note that this dictation was created using voice recognition software. I have made a reasonable attempt to correct obvious errors, but I expect that there are errors of grammar and possibly content that I did not discover before finalizing the note.

## 2022-01-25 DIAGNOSIS — J34.89 SINUS PRESSURE: ICD-10-CM

## 2022-01-25 DIAGNOSIS — R09.81 NASAL CONGESTION: ICD-10-CM

## 2022-01-25 DIAGNOSIS — R05.9 COUGH: ICD-10-CM

## 2022-01-25 DIAGNOSIS — J34.89 RHINORRHEA: ICD-10-CM

## 2022-01-25 DIAGNOSIS — G44.89 OTHER HEADACHE SYNDROME: ICD-10-CM

## 2022-01-25 LAB
COVID ORDER STATUS COVID19: NORMAL
SARS-COV-2 RNA RESP QL NAA+PROBE: DETECTED
SPECIMEN SOURCE: ABNORMAL

## 2022-02-11 ENCOUNTER — GYNECOLOGY VISIT (OUTPATIENT)
Dept: OBGYN | Facility: CLINIC | Age: 72
End: 2022-02-11
Payer: MEDICARE

## 2022-02-11 VITALS
WEIGHT: 152 LBS | HEART RATE: 87 BPM | DIASTOLIC BLOOD PRESSURE: 76 MMHG | SYSTOLIC BLOOD PRESSURE: 127 MMHG | BODY MASS INDEX: 23.11 KG/M2

## 2022-02-11 DIAGNOSIS — R33.9 INCOMPLETE BLADDER EMPTYING: ICD-10-CM

## 2022-02-11 DIAGNOSIS — N95.2 ATROPHIC VAGINITIS: ICD-10-CM

## 2022-02-11 PROCEDURE — 99213 OFFICE O/P EST LOW 20 MIN: CPT | Performed by: STUDENT IN AN ORGANIZED HEALTH CARE EDUCATION/TRAINING PROGRAM

## 2022-02-11 NOTE — PROGRESS NOTES
Urogynecology and Pelvic Reconstructive Surgery Post Op    Akilah Landry [id number] :1950    Referred by: Dr. Jessie Sloan    Reason for Visit:   No chief complaint on file.        Subjective     History of Presenting Illness:    Ms.Janice Anika Landyr is a 70yo who  Presents for follow up. She is 4 months s/p uncomplicated RaSCH/BSO, sacrocervicopexy, cysto. She reports doing well with no issues.     Pain: minimal  Vaginal bleeding: none  Bladder emptying: complete  Incontinence: none  Bowel function: normal    She recently had COVID and is still lethargic. No leakage with coughing.         Prior Pelvic surgery:   Tubal ligation  : RaSCH/BSO, sacrocervicopexy, cysto ([facility])       Prior treatment:   No     Fluid intake:   4 8oz glasses water  Occasional juice - cranberry    Pelvic floor symptom review:     Bladder:   Voids per day: 3-5 Voids per night: 0-1      Urinary incontinence episodes per day: occasional    Urge leakage:  On Movement to Bathroom and Full Bladder   Stress leakage: None   Continuous / insensible urine loss: No    Nocturnal enuresis: No    Leakage volume: Drops   Number of pads/day: none    Bladder emptying: Complete   Voiding symptoms: None   UTI in last 12 months: No   Other urologic history: none      Prolapse:     Prolapse symptoms: Resolved     Bowel:    Constipation: Yes, dietary related   Bowel movements per day: 1    Straining to empty bowels: Yes   Splinting to evacuate: No    Painful evacuation: No    Difficulty emptying rectum: No    Incontinence to stool: No   Incontinence to gas: No     Blood in stool: No    Hemorrhoids: No    Bowel conditions: none   Most recent colonoscopy: , return in 5 years       Sexual function:    Sexually active: No , however may be interested again if she meets someone   Gender of partners: Male   Pain with intercourse: No       Pelvic Pain: No      Past medical and surgical history    Past obstetric history   Number of vaginal  deliveries: 2   Number of  deliveries: 0   History of vacuum/forceps: No    History of obstetric anal sphincter injury: No     Past gynecological history:    Last menstrual period: Patient's last menstrual period was 2001.   History of abnormal uterine bleeding: No    History of fibroids: No    History of endometrial polyps:  No    History of endometriosis: No    History of cervical dysplasia: No    Last pap:        Past medical history:  Past Medical History:   Diagnosis Date   • GERD (gastroesophageal reflux disease) 3/5/2015   • Trigger finger of right hand 3/5/2015   • Cataract     no surgery   • Heart burn    • Hypertension    • Indigestion      Past surgical history:  Past Surgical History:   Procedure Laterality Date   • RI LAPAROSCOPY, SURG, COLPOPEXY  2021    Procedure: SACROCOLPOPEXY, ROBOT-ASSISTED, USING DA AMRIT XI;  Surgeon: Preethi Amezcua M.D.;  Location: [facility];  Service: Gyn Robotic   • RI CYSTOURETHROSCOPY  2021    Procedure: CYSTOSCOPY - CYSTOURETHROSCOPY;  Surgeon: Preethi Amezcua M.D.;  Location: [facility];  Service: Gyn Robotic   • HYSTERECTOMY, SUPRACERVICAL, ROBOT-ASSISTED, USING DA VI  2021    Procedure: HYSTERECTOMY,SUPRACERVICAL,ROBOT-ASSISTED,USING DA AMRIT XI;  Surgeon: Preethi Amezcua M.D.;  Location: [facility];  Service: Gyn Robotic   • SALPINGO OOPHORECTOMY Bilateral 2021    Procedure: SALPINGO-OOPHORECTOMY;  Surgeon: Preethi Amezcua M.D.;  Location: [facility];  Service: Gyn Robotic   • ORIF, WRIST      right   • TUBAL COAGULATION LAPAROSCOPIC BILATERAL       Medications:has a current medication list which includes the following prescription(s): tretinoin, benazepril, centrum silver 50+women, calcium carb-cholecalciferol, misc natural products, acetaminophen, ibuprofen, polyethylene glycol/lytes, estradiol, and omeprazole.  Allergies:Patient has no known allergies.  Family history:  Family History   Problem  Relation Age of Onset   • Cancer Sister         breast   • Heart Disease Mother    • Hypertension Mother    • Cancer Mother         breast   • Heart Disease Father         pacemaker   • Hypertension Father    • Hypertension Sister    • Cancer Maternal Grandfather    • Heart Disease Maternal Grandfather      Social history: reports that she has quit smoking. Her smoking use included cigarettes. She has a 0.40 pack-year smoking history. She has never used smokeless tobacco. She reports current alcohol use. She reports that she does not use drugs.         Objective        LMP 01/01/2001     Physical Exam  Vitals reviewed. Exam conducted with a chaperone present.   Constitutional:       Appearance: Normal appearance.   HENT:      Head: Normocephalic.      Mouth/Throat:      Mouth: Mucous membranes are moist.   Cardiovascular:      Rate and Rhythm: Normal rate.   Pulmonary:      Effort: Pulmonary effort is normal.   Abdominal:      General: There is no distension.      Palpations: Abdomen is soft. There is no mass.      Tenderness: There is no abdominal tenderness.      Comments: Laparoscopic incisions c/d/i, well-healed, no induration, erythema, discharge   Skin:     General: Skin is warm and dry.   Neurological:      Mental Status: She is alert.   Psychiatric:         Mood and Affect: Mood normal.         Genitourinary:    External female genitalia: WNL   Vulva: WNL   Bulbocavernosus reflex: Intact   Anal wink reflex: Intact   Perineal sensation: Absent   Urethra: WNL   Vagina: Atrophic   Atrophy: Mild   Cough stress test: Negative    Pelvic floor (post-operative):    POP-Q: Aa -1.5 / Ba -1.5 / TVL 10 / C -8.5 / D -9.5 / Ap -2 / Bp -2 / GH 3  / PB 2 /     Paravaginal defect: none   Urethral tenderness: No    Bladder/ suprapubic tenderness: No    Levator tenderness: None   Levator muscle tone: WNL   Palpable anal sphincter defect: No    Granulation tissue: No     Mesh/suture exposure: no   Palpable mesh: no   Epithelial  erosions: No    Epithelial ulcerations: No    Fistula: None   Vaginal band/stricture: No     Procedure Performed:  cystometrogram (see separate note)             Assessment & Plan     Ms.Janice Anika Landry is a 71 y.o. year old P2 s/p robotic ONEL/BSO, sacrocolpopexy. Excellent tricompartmental support, meeting all postoperative milestones.     1. H/o prolapse, s/p sacrocolpopexy  Doing very well - no recurrent prolapse, no mesh exposures/erosions  - Follow up as needed    4. Atrophic vaginitis  Continue vaginal estrogen 1-2 times per week for maintenance  Refill sent to [facility] pharmacy, since prior rx was too expensive.            Preethi Amezcua MD, FACOG    Female Pelvic Medicine and Reconstructive Surgery  Department of Obstetrics and Gynecology  [facility]      This medical record contains text that has been entered with the assistance of computer voice recognition and dictation software.  Therefore, it may contain unintended errors in text, spelling, punctuation, or grammar    Medical decision making (MDM)  22 minutes total time spent on the date of the encounter:    2 min reviewing records  10 min with the history and physical exam   5 min  spent in direct patient education and counseling   0 min spent in the coordination of care  5 min spent in electronic medical record documentation in the patient's chart.

## 2022-02-11 NOTE — NON-PROVIDER
Patient here for Second Post Op Exam  Surgery Date: 09/29/2021; Post Op Exam 11/08/2021  Good #: [phone] (home)   Pharmacy Verified

## 2022-09-04 ENCOUNTER — APPOINTMENT (OUTPATIENT)
Dept: RADIOLOGY | Facility: IMAGING CENTER | Age: 72
End: 2022-09-04
Attending: NURSE PRACTITIONER
Payer: MEDICARE

## 2022-09-04 ENCOUNTER — OFFICE VISIT (OUTPATIENT)
Dept: URGENT CARE | Facility: PHYSICIAN GROUP | Age: 72
End: 2022-09-04
Payer: MEDICARE

## 2022-09-04 VITALS
TEMPERATURE: 97.9 F | BODY MASS INDEX: 23.19 KG/M2 | RESPIRATION RATE: 16 BRPM | WEIGHT: 153 LBS | SYSTOLIC BLOOD PRESSURE: 130 MMHG | HEIGHT: 68 IN | OXYGEN SATURATION: 96 % | DIASTOLIC BLOOD PRESSURE: 80 MMHG | HEART RATE: 84 BPM

## 2022-09-04 DIAGNOSIS — R07.9 CHEST PAIN AT REST: ICD-10-CM

## 2022-09-04 DIAGNOSIS — R06.02 SHORTNESS OF BREATH AT REST: ICD-10-CM

## 2022-09-04 LAB
EXTERNAL QUALITY CONTROL: NORMAL
INT CON NEG: NEGATIVE
INT CON POS: POSITIVE
SARS-COV+SARS-COV-2 AG RESP QL IA.RAPID: NEGATIVE

## 2022-09-04 PROCEDURE — 93000 ELECTROCARDIOGRAM COMPLETE: CPT | Performed by: NURSE PRACTITIONER

## 2022-09-04 PROCEDURE — 71046 X-RAY EXAM CHEST 2 VIEWS: CPT | Mod: TC,FY | Performed by: NURSE PRACTITIONER

## 2022-09-04 PROCEDURE — 87426 SARSCOV CORONAVIRUS AG IA: CPT | Performed by: NURSE PRACTITIONER

## 2022-09-04 PROCEDURE — 99213 OFFICE O/P EST LOW 20 MIN: CPT | Mod: CS | Performed by: NURSE PRACTITIONER

## 2022-09-04 ASSESSMENT — ENCOUNTER SYMPTOMS
NAUSEA: 0
HEADACHES: 1
SPUTUM PRODUCTION: 0
WHEEZING: 0
HEMOPTYSIS: 0
COUGH: 0
CHILLS: 0
FEVER: 0
MYALGIAS: 0
SHORTNESS OF BREATH: 1

## 2022-09-04 NOTE — PROGRESS NOTES
Subjective     Tremaine Landry is a 72 y.o. female who presents with Shortness of Breath (This morning, getting worse, working at the rib [employer] off, mild chest px )            HPI  New problem.  Patient is a 72-year-old female who presents with shortness of breath that began this morning as she was working at the rib [employer] off and accompanied by some mild chest pressure.  She reports taking small short breaths.  She denies dizziness, headache, congestion, cough, or wheezing.  She denies any nausea.  She does have a history of hypertension however no other chronic heart or lung disease.  She has not taken anything for her medications.    Patient has no known allergies.  Current Outpatient Medications on File Prior to Visit   Medication Sig Dispense Refill    tretinoin (RETIN-A) 0.025 % cream APPLY A PEA SIZE AMOUNT OF CREAM TOPICALLY AT BEDTIME EVERY OTHER NIGHT, THEN INCREASE AS TOLERATED      benazepril (LOTENSIN) 40 MG tablet Take 40 mg by mouth every day.      Multiple Vitamins-Minerals (CENTRUM SILVER 50+WOMEN) Tab Take 1 Tablet by mouth every day.      Calcium Carb-Cholecalciferol (CALCIUM PLUS VITAMIN D3 PO) Take 1 Tablet by mouth every day.      Misc Natural Products (GLUCOSAMINE CHONDROITIN ADV PO) Take 1 Tablet by mouth every day.      acetaminophen (TYLENOL) 500 MG Tab Take 1000mg (2 tab) three times daily for the first 7 days after surgery. After 7 days, you may take 500mg (1 tab) every 4 hours as needed for pain. 60 Tablet 1    ibuprofen (MOTRIN) 400 MG Tab Take 800mg every 8 hours for the first 7 days after your surgery. After 7 days, you may take 400mg every 4-8 hours as needed for pain. 60 Tablet 1    polyethylene glycol/lytes (MIRALAX) 17 g Pack Take 1 Packet by mouth every day. Take once every day to prevent constipation after surgery. Hold if you develop diarrhea, then resume the next day. 30 Each 1    estradiol (ESTRACE VAGINAL) 0.1 MG/GM vaginal cream Apply 1g cream inside vagina nightly for 2  "weeks, then twice per week thereafter 1 Each 3    omeprazole (PRILOSEC) 20 MG delayed-release capsule Take 1 Cap by mouth every day. 90 Cap 1     No current facility-administered medications on file prior to visit.     Social History     Socioeconomic History    Marital status:      Spouse name: Not on file    Number of children: 2    Years of education: HS    Highest education level: Not on file   Occupational History    Occupation: Works for      Employer: self employed   Tobacco Use    Smoking status: Former     Packs/day: 0.10     Years: 4.00     Pack years: 0.40     Types: Cigarettes    Smokeless tobacco: Never    Tobacco comments:     smoked socailly in her 20's   Vaping Use    Vaping Use: Never used   Substance and Sexual Activity    Alcohol use: Yes     Comment: 1-2 several nights per week on average    Drug use: No    Sexual activity: Not Currently     Partners: Male     Birth control/protection: None   Other Topics Concern    Not on file   Social History Narrative    Not on file     Social Determinants of Health     Financial Resource Strain: Not on file   Food Insecurity: Not on file   Transportation Needs: Not on file   Physical Activity: Not on file   Stress: Not on file   Social Connections: Not on file   Intimate Partner Violence: Not on file   Housing Stability: Not on file     Breast Cancer-related family history is not on file.      Review of Systems   Constitutional:  Negative for chills and fever.   Respiratory:  Positive for shortness of breath. Negative for cough, hemoptysis, sputum production and wheezing.    Cardiovascular:  Positive for chest pain.   Gastrointestinal:  Negative for nausea.   Musculoskeletal:  Negative for myalgias.   Neurological:  Positive for headaches.            Objective     /80 (BP Location: Right arm, Patient Position: Sitting, BP Cuff Size: Adult)   Pulse 84   Temp 36.6 °C (97.9 °F) (Temporal)   Resp 16   Ht 1.727 m (5' 8\")   Wt 69.4 kg " (153 lb)   LMP 01/01/2001   SpO2 96%   BMI 23.26 kg/m²      Physical Exam  Vitals and nursing note reviewed.   Constitutional:       General: She is not in acute distress.     Appearance: Normal appearance. She is well-developed.   HENT:      Head: Normocephalic.      Right Ear: Tympanic membrane and external ear normal.      Left Ear: Tympanic membrane and external ear normal.      Nose: Mucosal edema present.   Eyes:      General:         Right eye: No discharge.         Left eye: No discharge.      Conjunctiva/sclera: Conjunctivae normal.   Cardiovascular:      Rate and Rhythm: Normal rate and regular rhythm.      Heart sounds: Normal heart sounds.   Pulmonary:      Effort: Pulmonary effort is normal.      Breath sounds: Normal breath sounds.   Musculoskeletal:         General: Normal range of motion.      Cervical back: Normal range of motion and neck supple.   Lymphadenopathy:      Cervical: No cervical adenopathy.   Skin:     General: Skin is warm and dry.   Neurological:      Mental Status: She is alert and oriented to person, place, and time.   Psychiatric:         Behavior: Behavior normal.         Thought Content: Thought content normal.                           Assessment & Plan        1. Chest pain at rest  EKG - Clinic Performed      2. Shortness of breath at rest  DX-CHEST-2 VIEWS    POCT SARS-COV Antigen RENATA (Symptomatic only)        Patient normalized here in clinic.  Covid, and chest imaging negative.  EKG with NSR, rate of 69, no ectopy or ST deviation of any sort.  She is discharged home in stable conditions with stable vital signs and advised to take next couple of days off from cook off.   Strict follow up and ED precautions discussed.

## 2022-11-09 ENCOUNTER — PATIENT MESSAGE (OUTPATIENT)
Dept: HEALTH INFORMATION MANAGEMENT | Facility: OTHER | Age: 72
End: 2022-11-09

## 2023-01-12 ENCOUNTER — APPOINTMENT (RX ONLY)
Dept: URBAN - METROPOLITAN AREA CLINIC 4 | Facility: CLINIC | Age: 73
Setting detail: DERMATOLOGY
End: 2023-01-12

## 2023-01-12 DIAGNOSIS — L82.1 OTHER SEBORRHEIC KERATOSIS: ICD-10-CM

## 2023-01-12 DIAGNOSIS — D18.0 HEMANGIOMA: ICD-10-CM

## 2023-01-12 DIAGNOSIS — L57.8 OTHER SKIN CHANGES DUE TO CHRONIC EXPOSURE TO NONIONIZING RADIATION: ICD-10-CM

## 2023-01-12 DIAGNOSIS — Z71.89 OTHER SPECIFIED COUNSELING: ICD-10-CM

## 2023-01-12 DIAGNOSIS — L70.0 ACNE VULGARIS: ICD-10-CM | Status: WELL CONTROLLED

## 2023-01-12 DIAGNOSIS — D22 MELANOCYTIC NEVI: ICD-10-CM

## 2023-01-12 DIAGNOSIS — L90.5 SCAR CONDITIONS AND FIBROSIS OF SKIN: ICD-10-CM

## 2023-01-12 DIAGNOSIS — L81.4 OTHER MELANIN HYPERPIGMENTATION: ICD-10-CM

## 2023-01-12 PROBLEM — D18.01 HEMANGIOMA OF SKIN AND SUBCUTANEOUS TISSUE: Status: ACTIVE | Noted: 2023-01-12

## 2023-01-12 PROBLEM — D22.5 MELANOCYTIC NEVI OF TRUNK: Status: ACTIVE | Noted: 2023-01-12

## 2023-01-12 PROCEDURE — ? DIAGNOSIS COMMENT

## 2023-01-12 PROCEDURE — 99213 OFFICE O/P EST LOW 20 MIN: CPT

## 2023-01-12 PROCEDURE — ? SUNSCREEN RECOMMENDATIONS

## 2023-01-12 PROCEDURE — ? COUNSELING

## 2023-01-12 ASSESSMENT — LOCATION DETAILED DESCRIPTION DERM
LOCATION DETAILED: LEFT BUTTOCK
LOCATION DETAILED: RIGHT DISTAL RADIAL DORSAL FOREARM
LOCATION DETAILED: RIGHT RADIAL DORSAL HAND
LOCATION DETAILED: LEFT PROXIMAL CALF
LOCATION DETAILED: RIGHT BUTTOCK
LOCATION DETAILED: LEFT INFERIOR CENTRAL MALAR CHEEK
LOCATION DETAILED: RIGHT SUPERIOR LATERAL LOWER BACK

## 2023-01-12 ASSESSMENT — LOCATION ZONE DERM
LOCATION ZONE: TRUNK
LOCATION ZONE: HAND
LOCATION ZONE: LEG
LOCATION ZONE: ARM
LOCATION ZONE: FACE

## 2023-01-12 ASSESSMENT — LOCATION SIMPLE DESCRIPTION DERM
LOCATION SIMPLE: RIGHT BUTTOCK
LOCATION SIMPLE: RIGHT HAND
LOCATION SIMPLE: RIGHT LOWER BACK
LOCATION SIMPLE: LEFT CALF
LOCATION SIMPLE: RIGHT FOREARM
LOCATION SIMPLE: LEFT CHEEK
LOCATION SIMPLE: LEFT BUTTOCK

## 2023-01-12 NOTE — PROCEDURE: COUNSELING
Detail Level: Generalized
Topical Retinoid Pregnancy And Lactation Text: This medication is Pregnancy Category C. It is unknown if this medication is excreted in breast milk.
High Dose Vitamin A Counseling: Side effects reviewed, pt to contact office should one occur.
Azithromycin Counseling:  I discussed with the patient the risks of azithromycin including but not limited to GI upset, allergic reaction, drug rash, diarrhea, and yeast infections.
Sarecycline Counseling: Patient advised regarding possible photosensitivity and discoloration of the teeth, skin, lips, tongue and gums.  Patient instructed to avoid sunlight, if possible.  When exposed to sunlight, patients should wear protective clothing, sunglasses, and sunscreen.  The patient was instructed to call the office immediately if the following severe adverse effects occur:  hearing changes, easy bruising/bleeding, severe headache, or vision changes.  The patient verbalized understanding of the proper use and possible adverse effects of sarecycline.  All of the patient's questions and concerns were addressed.
Tetracycline Counseling: Patient counseled regarding possible photosensitivity and increased risk for sunburn.  Patient instructed to avoid sunlight, if possible.  When exposed to sunlight, patients should wear protective clothing, sunglasses, and sunscreen.  The patient was instructed to call the office immediately if the following severe adverse effects occur:  hearing changes, easy bruising/bleeding, severe headache, or vision changes.  The patient verbalized understanding of the proper use and possible adverse effects of tetracycline.  All of the patient's questions and concerns were addressed. Patient understands to avoid pregnancy while on therapy due to potential birth defects.
Doxycycline Pregnancy And Lactation Text: This medication is Pregnancy Category D and not consider safe during pregnancy. It is also excreted in breast milk but is considered safe for shorter treatment courses.
Isotretinoin Pregnancy And Lactation Text: This medication is Pregnancy Category X and is considered extremely dangerous during pregnancy. It is unknown if it is excreted in breast milk.
Topical Clindamycin Pregnancy And Lactation Text: This medication is Pregnancy Category B and is considered safe during pregnancy. It is unknown if it is excreted in breast milk.
Birth Control Pills Counseling: Birth Control Pill Counseling: I discussed with the patient the potential side effects of OCPs including but not limited to increased risk of stroke, heart attack, thrombophlebitis, deep venous thrombosis, hepatic adenomas, breast changes, GI upset, headaches, and depression.  The patient verbalized understanding of the proper use and possible adverse effects of OCPs. All of the patient's questions and concerns were addressed.
Include Pregnancy/Lactation Warning?: No
Dapsone Counseling: I discussed with the patient the risks of dapsone including but not limited to hemolytic anemia, agranulocytosis, rashes, methemoglobinemia, kidney failure, peripheral neuropathy, headaches, GI upset, and liver toxicity.  Patients who start dapsone require monitoring including baseline LFTs and weekly CBCs for the first month, then every month thereafter.  The patient verbalized understanding of the proper use and possible adverse effects of dapsone.  All of the patient's questions and concerns were addressed.
Benzoyl Peroxide Counseling: Patient counseled that medicine may cause skin irritation and bleach clothing.  In the event of skin irritation, the patient was advised to reduce the amount of the drug applied or use it less frequently.   The patient verbalized understanding of the proper use and possible adverse effects of benzoyl peroxide.  All of the patient's questions and concerns were addressed.
Detail Level: Zone
Tazorac Counseling:  Patient advised that medication is irritating and drying.  Patient may need to apply sparingly and wash off after an hour before eventually leaving it on overnight.  The patient verbalized understanding of the proper use and possible adverse effects of tazorac.  All of the patient's questions and concerns were addressed.
High Dose Vitamin A Pregnancy And Lactation Text: High dose vitamin A therapy is contraindicated during pregnancy and breast feeding.
Topical Sulfur Applications Counseling: Topical Sulfur Counseling: Patient counseled that this medication may cause skin irritation or allergic reactions.  In the event of skin irritation, the patient was advised to reduce the amount of the drug applied or use it less frequently.   The patient verbalized understanding of the proper use and possible adverse effects of topical sulfur application.  All of the patient's questions and concerns were addressed.
Azithromycin Pregnancy And Lactation Text: This medication is considered safe during pregnancy and is also secreted in breast milk.
Sarecycline Pregnancy And Lactation Text: This medication is Pregnancy Category D and not consider safe during pregnancy. It is also excreted in breast milk.
Birth Control Pills Pregnancy And Lactation Text: This medication should be avoided if pregnant and for the first 30 days post-partum.
Erythromycin Counseling:  I discussed with the patient the risks of erythromycin including but not limited to GI upset, allergic reaction, drug rash, diarrhea, increase in liver enzymes, and yeast infections.
Benzoyl Peroxide Pregnancy And Lactation Text: This medication is Pregnancy Category C. It is unknown if benzoyl peroxide is excreted in breast milk.
Bactrim Pregnancy And Lactation Text: This medication is Pregnancy Category D and is known to cause fetal risk.  It is also excreted in breast milk.
Minocycline Counseling: Patient advised regarding possible photosensitivity and discoloration of the teeth, skin, lips, tongue and gums.  Patient instructed to avoid sunlight, if possible.  When exposed to sunlight, patients should wear protective clothing, sunglasses, and sunscreen.  The patient was instructed to call the office immediately if the following severe adverse effects occur:  hearing changes, easy bruising/bleeding, severe headache, or vision changes.  The patient verbalized understanding of the proper use and possible adverse effects of minocycline.  All of the patient's questions and concerns were addressed.
Dapsone Pregnancy And Lactation Text: This medication is Pregnancy Category C and is not considered safe during pregnancy or breast feeding.
Erythromycin Pregnancy And Lactation Text: This medication is Pregnancy Category B and is considered safe during pregnancy. It is also excreted in breast milk.
Tazorac Pregnancy And Lactation Text: This medication is not safe during pregnancy. It is unknown if this medication is excreted in breast milk.
Bactrim Counseling:  I discussed with the patient the risks of sulfa antibiotics including but not limited to GI upset, allergic reaction, drug rash, diarrhea, dizziness, photosensitivity, and yeast infections.  Rarely, more serious reactions can occur including but not limited to aplastic anemia, agranulocytosis, methemoglobinemia, blood dyscrasias, liver or kidney failure, lung infiltrates or desquamative/blistering drug rashes.
Spironolactone Counseling: Patient advised regarding risks of diarrhea, abdominal pain, hyperkalemia, birth defects (for female patients), liver toxicity and renal toxicity. The patient may need blood work to monitor liver and kidney function and potassium levels while on therapy. The patient verbalized understanding of the proper use and possible adverse effects of spironolactone.  All of the patient's questions and concerns were addressed.
Topical Sulfur Applications Pregnancy And Lactation Text: This medication is Pregnancy Category C and has an unknown safety profile during pregnancy. It is unknown if this topical medication is excreted in breast milk.
Winlevi Pregnancy And Lactation Text: This medication is considered safe during pregnancy and breastfeeding.
Topical Retinoid counseling:  Patient advised to apply a pea-sized amount only at bedtime and wait 30 minutes after washing their face before applying.  If too drying, patient may add a non-comedogenic moisturizer. The patient verbalized understanding of the proper use and possible adverse effects of retinoids.  All of the patient's questions and concerns were addressed.
Doxycycline Counseling:  Patient counseled regarding possible photosensitivity and increased risk for sunburn.  Patient instructed to avoid sunlight, if possible.  When exposed to sunlight, patients should wear protective clothing, sunglasses, and sunscreen.  The patient was instructed to call the office immediately if the following severe adverse effects occur:  hearing changes, easy bruising/bleeding, severe headache, or vision changes.  The patient verbalized understanding of the proper use and possible adverse effects of doxycycline.  All of the patient's questions and concerns were addressed.
Topical Clindamycin Counseling: Patient counseled that this medication may cause skin irritation or allergic reactions.  In the event of skin irritation, the patient was advised to reduce the amount of the drug applied or use it less frequently.   The patient verbalized understanding of the proper use and possible adverse effects of clindamycin.  All of the patient's questions and concerns were addressed.
Spironolactone Pregnancy And Lactation Text: This medication can cause feminization of the male fetus and should be avoided during pregnancy. The active metabolite is also found in breast milk.
Winlevi Counseling:  I discussed with the patient the risks of topical clascoterone including but not limited to erythema, scaling, itching, and stinging. Patient voiced their understanding.
Isotretinoin Counseling: Patient should get monthly blood tests, not donate blood, not drive at night if vision affected, not share medication, and not undergo elective surgery for 6 months after tx completed. Side effects reviewed, pt to contact office should one occur.
Detail Level: Detailed
Aklief Pregnancy And Lactation Text: It is unknown if this medication is safe to use during pregnancy.  It is unknown if this medication is excreted in breast milk.  Breastfeeding women should use the topical cream on the smallest area of the skin for the shortest time needed while breastfeeding.  Do not apply to nipple and areola.
Azelaic Acid Counseling: Patient counseled that medicine may cause skin irritation and to avoid applying near the eyes.  In the event of skin irritation, the patient was advised to reduce the amount of the drug applied or use it less frequently.   The patient verbalized understanding of the proper use and possible adverse effects of azelaic acid.  All of the patient's questions and concerns were addressed.
Azelaic Acid Pregnancy And Lactation Text: This medication is considered safe during pregnancy and breast feeding.
Aklief counseling:  Patient advised to apply a pea-sized amount only at bedtime and wait 30 minutes after washing their face before applying.  If too drying, patient may add a non-comedogenic moisturizer.  The most commonly reported side effects including irritation, redness, scaling, dryness, stinging, burning, itching, and increased risk of sunburn.  The patient verbalized understanding of the proper use and possible adverse effects of retinoids.  All of the patient's questions and concerns were addressed.

## 2023-01-12 NOTE — PROCEDURE: DIAGNOSIS COMMENT
Detail Level: Detailed
Render Risk Assessment In Note?: no
Comment: History of Solar Lentigo - [id number].

## 2024-01-15 ENCOUNTER — APPOINTMENT (RX ONLY)
Dept: URBAN - METROPOLITAN AREA CLINIC 15 | Facility: CLINIC | Age: 74
Setting detail: DERMATOLOGY
End: 2024-01-15

## 2024-01-15 DIAGNOSIS — D18.0 HEMANGIOMA: ICD-10-CM

## 2024-01-15 DIAGNOSIS — L57.8 OTHER SKIN CHANGES DUE TO CHRONIC EXPOSURE TO NONIONIZING RADIATION: ICD-10-CM

## 2024-01-15 DIAGNOSIS — Z71.89 OTHER SPECIFIED COUNSELING: ICD-10-CM

## 2024-01-15 DIAGNOSIS — L70.0 ACNE VULGARIS: ICD-10-CM

## 2024-01-15 DIAGNOSIS — L82.1 OTHER SEBORRHEIC KERATOSIS: ICD-10-CM

## 2024-01-15 DIAGNOSIS — L57.0 ACTINIC KERATOSIS: ICD-10-CM

## 2024-01-15 DIAGNOSIS — L90.5 SCAR CONDITIONS AND FIBROSIS OF SKIN: ICD-10-CM

## 2024-01-15 DIAGNOSIS — D22 MELANOCYTIC NEVI: ICD-10-CM

## 2024-01-15 DIAGNOSIS — L81.4 OTHER MELANIN HYPERPIGMENTATION: ICD-10-CM

## 2024-01-15 PROBLEM — D18.01 HEMANGIOMA OF SKIN AND SUBCUTANEOUS TISSUE: Status: ACTIVE | Noted: 2024-01-15

## 2024-01-15 PROBLEM — D22.5 MELANOCYTIC NEVI OF TRUNK: Status: ACTIVE | Noted: 2024-01-15

## 2024-01-15 PROCEDURE — 99213 OFFICE O/P EST LOW 20 MIN: CPT | Mod: 25

## 2024-01-15 PROCEDURE — ? DIAGNOSIS COMMENT

## 2024-01-15 PROCEDURE — 17000 DESTRUCT PREMALG LESION: CPT

## 2024-01-15 PROCEDURE — ? COUNSELING

## 2024-01-15 PROCEDURE — ? LIQUID NITROGEN

## 2024-01-15 PROCEDURE — ? SUNSCREEN RECOMMENDATIONS

## 2024-01-15 ASSESSMENT — LOCATION ZONE DERM
LOCATION ZONE: TRUNK
LOCATION ZONE: LEG
LOCATION ZONE: FACE
LOCATION ZONE: ARM
LOCATION ZONE: HAND

## 2024-01-15 ASSESSMENT — LOCATION SIMPLE DESCRIPTION DERM
LOCATION SIMPLE: RIGHT BUTTOCK
LOCATION SIMPLE: RIGHT HAND
LOCATION SIMPLE: LEFT FOREARM
LOCATION SIMPLE: LEFT CHEEK
LOCATION SIMPLE: LEFT BUTTOCK
LOCATION SIMPLE: LEFT CALF
LOCATION SIMPLE: RIGHT FOREARM
LOCATION SIMPLE: RIGHT LOWER BACK

## 2024-01-15 ASSESSMENT — LOCATION DETAILED DESCRIPTION DERM
LOCATION DETAILED: RIGHT RADIAL DORSAL HAND
LOCATION DETAILED: RIGHT BUTTOCK
LOCATION DETAILED: LEFT DISTAL DORSAL FOREARM
LOCATION DETAILED: RIGHT SUPERIOR LATERAL LOWER BACK
LOCATION DETAILED: LEFT INFERIOR CENTRAL MALAR CHEEK
LOCATION DETAILED: LEFT PROXIMAL CALF
LOCATION DETAILED: RIGHT DISTAL RADIAL DORSAL FOREARM
LOCATION DETAILED: LEFT BUTTOCK

## 2024-01-15 NOTE — PROCEDURE: LIQUID NITROGEN
Render Note In Bullet Format When Appropriate: No
Render Post-Care Instructions In Note?: yes
Duration Of Freeze Thaw-Cycle (Seconds): 3
Number Of Freeze-Thaw Cycles: 1 freeze-thaw cycle
Post-Care Instructions: I reviewed with the patient in detail post-care instructions. Patient is to wear sunprotection, and avoid picking at any of the treated lesions. Pt may apply Vaseline to crusted or scabbing areas.
Detail Level: Detailed
Aperture Size (Optional): A
Consent: The patient's consent was obtained including but not limited to risks of crusting, scabbing, blistering, scarring, darker or lighter pigmentary change, recurrence, incomplete removal and infection.

## 2024-01-15 NOTE — PROCEDURE: DIAGNOSIS COMMENT
Detail Level: Detailed
Render Risk Assessment In Note?: no
Comment: History of Solar Lentigo - [id number].

## 2024-02-23 ENCOUNTER — HOSPITAL ENCOUNTER (OUTPATIENT)
Facility: MEDICAL CENTER | Age: 74
End: 2024-02-23
Attending: NURSE PRACTITIONER
Payer: MEDICARE

## 2024-02-23 ENCOUNTER — OFFICE VISIT (OUTPATIENT)
Dept: OBGYN | Facility: CLINIC | Age: 74
End: 2024-02-23
Payer: MEDICARE

## 2024-02-23 VITALS — DIASTOLIC BLOOD PRESSURE: 90 MMHG | WEIGHT: 148 LBS | SYSTOLIC BLOOD PRESSURE: 136 MMHG | BODY MASS INDEX: 22.5 KG/M2

## 2024-02-23 DIAGNOSIS — Z12.31 ENCOUNTER FOR SCREENING MAMMOGRAM FOR BREAST CANCER: ICD-10-CM

## 2024-02-23 DIAGNOSIS — Z12.4 ENCOUNTER FOR PAPANICOLAOU SMEAR FOR CERVICAL CANCER SCREENING: ICD-10-CM

## 2024-02-23 DIAGNOSIS — Z01.419 WELL WOMAN EXAM WITH ROUTINE GYNECOLOGICAL EXAM: ICD-10-CM

## 2024-02-23 DIAGNOSIS — N95.1 MENOPAUSAL VAGINAL DRYNESS: ICD-10-CM

## 2024-02-23 DIAGNOSIS — Z11.51 SCREENING FOR HPV (HUMAN PAPILLOMAVIRUS): ICD-10-CM

## 2024-02-23 PROCEDURE — 87624 HPV HI-RISK TYP POOLED RSLT: CPT

## 2024-02-23 PROCEDURE — 3075F SYST BP GE 130 - 139MM HG: CPT | Performed by: NURSE PRACTITIONER

## 2024-02-23 PROCEDURE — 88175 CYTOPATH C/V AUTO FLUID REDO: CPT

## 2024-02-23 PROCEDURE — 3080F DIAST BP >= 90 MM HG: CPT | Performed by: NURSE PRACTITIONER

## 2024-02-23 PROCEDURE — G0101 CA SCREEN;PELVIC/BREAST EXAM: HCPCS | Performed by: NURSE PRACTITIONER

## 2024-02-23 RX ORDER — ESTRADIOL 0.1 MG/G
CREAM VAGINAL
Qty: 1 EACH | Refills: 6 | Status: SHIPPED | OUTPATIENT
Start: 2024-02-23

## 2024-02-23 NOTE — PROGRESS NOTES
ANNUAL GYNECOLOGY VISIT    HPI:  Patient is a very pleasant 73 y.o.  who presents for her annual exam.  Patient with history of Robotic Supracervical hysterectomy, BSO, sacrocolpopexy with Dr. Amezcua on 21.  Patient denies any recurrence of prolapse.  Patient is currently not sexually active.  She continues to utilize vaginal estrogen and denies any vaginal dryness.  Patient will be due for mammogram this year.  She does have a family history of breast cancer in her sister.  The patient declines referral for genetic screening.  Patient is due for colonoscopy next year and is a established patient with [facility].  Patient is advised to reach out to [facility] to schedule appointment.  Patient stays fairly healthy and denies any new health problems.      PREVENTIVE CARE:  Immunization History   Administered Date(s) Administered    Comirnaty (Covid-19 Vaccine, Mrna, [id number] Formula) 2023    Influenza (IM) Preservative Free - HISTORICAL DATA 10/08/2012    Influenza Seasonal Injectable - Historical Data 10/12/2011, 2013    Influenza Vaccine Adult HD 10/09/2017    Influenza Vaccine Quad Inj (Pf) 10/07/2015, 2016, 2018    Influenza Vaccine Quad Inj (Preserved) 2014    Influenza, Unspecified - HISTORICAL DATA 2019    PFIZER GRIDER CAP SARS-COV-2 VACCINATION (12+) 06/10/2022    PFIZER PURPLE CAP SARS-COV-2 VACCINATION (12+) 2021, 2021, 2022    Pneumococcal Conjugate Vaccine (Prevnar/PCV-13) 2015    Pneumococcal polysaccharide vaccine (PPSV-23) 2019    Tdap Vaccine 2012       Last Colon CA screen: EGD/Colonoscopy ??, normal per patient. Due for colonscopy in .   Last Mammogram: 23 normal  Last DEXA: 23 Osteopenia/low bone mass, suggestive of increased fracture risk.   Taking Calcium/Vit D Supp: Yes  Personal Hx of fracture as an adult: Yes, right wrist fx in   Hx of fracture in first-degree relative:  No   race: Yes  Dementia: No  Poor health/frailty: No  Recurrent falls: No  Tobacco use:  former smoker  Low body weight (<127 lbs): No  Early menopause (age <45) or BSO: No  Alcoholism:No  Inadequate physical activity: Yes        CANCER RISK ASSESSMENT:  Family history of:   - Breast cancer: sister at age 60   - Ovarian cancer: no   - Uterine cancer: no   - Colon cancer: no    GYN HX and ROS:   Last Pap: Sometime prior to hysterectomy, unknown date  Failed to redirect to the [facility]eline [facility] the [facility].  Hx Mod or Severe Dysplasia : No  Age at Menopause: 50s?  Sexually Active: No  Surgical: Robotic Supracervical hysterectomy, BSO, sacrocolpopexy with Dr. Amezcua on 21.     Postmenopausal bleeding: No  Sexual problems: No  Significant pelvic pain: No  Bothersome menopausal symptoms: No      ROS:    General: She denies excessive fatigue, weakness, unexplained weight loss or gain, abnormal thirst, unexplained fever/chills.  Neurologic: She denies fainting spells, convulsions, dizzy spells, frequent severe headaches, or vision changes.  HEENT: She denies unusual skin moles, persistent swollen glands, pain or stiff neck, goiter or lump in her neck.  Heart / Lung: She denies persistent cough, shortness of breath, severe chest pain or recurrent heart flutters.  Breast: She denies breast discharge, pain, lump or lump under her arm.  Gastrointestinal: She denies bloody stools, loss of appetite, change in bowel habits, constipation, diarrhea, nausea, vomiting or persistent abdominal pain.  Urinary: She denies dysuria, urgency, frequency, incontinence, hematuria, kidney or bladder infections.  Psych: no depression or anxiety   Extremeties: She denies joint pain, persistently swollen ankles or recurrent leg cramps.        OBSTETRIC HISTORY:  OB History    Para Term  AB Living   2 2 2     2   SAB IAB Ectopic Molar Multiple Live Births             2      # Outcome Date GA Lbr Gordon/2nd Weight  Sex Delivery Anes PTL Lv   2 Term 01/04/76 40w0d  9 lb 3 oz F Vag-Spont  N JONNA   1 Term 08/03/69 40w0d  9 lb 3 oz M Vag-Spont   JONNA       MEDICAL HISTORY:  Past Medical History:   Diagnosis Date    Cataract     no surgery    GERD (gastroesophageal reflux disease) 3/5/2015    Heart burn     Hypertension     Indigestion     Trigger finger of right hand 3/5/2015       MEDICATIONS:  Current Outpatient Medications   Medication Sig    estradiol (ESTRACE VAGINAL) 0.1 MG/GM vaginal cream Apply 1g cream inside vagina twice per week    tretinoin (RETIN-A) 0.025 % cream APPLY A PEA SIZE AMOUNT OF CREAM TOPICALLY AT BEDTIME EVERY OTHER NIGHT, THEN INCREASE AS TOLERATED    benazepril (LOTENSIN) 40 MG tablet Take 40 mg by mouth every day.    Calcium Carb-Cholecalciferol (CALCIUM PLUS VITAMIN D3 PO) Take 1 Tablet by mouth every day.    Misc Natural Products (GLUCOSAMINE CHONDROITIN ADV PO) Take 1 Tablet by mouth every day.    acetaminophen (TYLENOL) 500 MG Tab Take 1000mg (2 tab) three times daily for the first 7 days after surgery. After 7 days, you may take 500mg (1 tab) every 4 hours as needed for pain.    ibuprofen (MOTRIN) 400 MG Tab Take 800mg every 8 hours for the first 7 days after your surgery. After 7 days, you may take 400mg every 4-8 hours as needed for pain.    estradiol (ESTRACE VAGINAL) 0.1 MG/GM vaginal cream Apply 1g cream inside vagina nightly for 2 weeks, then twice per week thereafter    omeprazole (PRILOSEC) 20 MG delayed-release capsule Take 1 Cap by mouth every day.    Multiple Vitamins-Minerals (CENTRUM SILVER 50+WOMEN) Tab Take 1 Tablet by mouth every day. (Patient not taking: Reported on 2/23/2024)    polyethylene glycol/lytes (MIRALAX) 17 g Pack Take 1 Packet by mouth every day. Take once every day to prevent constipation after surgery. Hold if you develop diarrhea, then resume the next day. (Patient not taking: Reported on 2/23/2024)       ALLERGIES / REACTIONS:  No Known Allergies    FAMILY  HISTORY:  Family History   Problem Relation Age of Onset    Cancer Sister         breast    Heart Disease Mother     Hypertension Mother     Cancer Mother         breast    Heart Disease Father         pacemaker    Hypertension Father     Hypertension Sister     Cancer Maternal Grandfather     Heart Disease Maternal Grandfather        SURGICAL HISTORY:  Past Surgical History:   Procedure Laterality Date    DE LAPAROSCOPY, SURG, COLPOPEXY  9/29/2021    Procedure: SACROCOLPOPEXY, ROBOT-ASSISTED, USING DA AMRIT XI;  Surgeon: Preethi Amezcua M.D.;  Location: SURGERY [facility];  Service: Gyn Robotic    DE CYSTOURETHROSCOPY  9/29/2021    Procedure: CYSTOSCOPY - CYSTOURETHROSCOPY;  Surgeon: Preethi Amezcua M.D.;  Location: SURGERY [facility];  Service: Gyn Robotic    HYSTERECTOMY, SUPRACERVICAL, ROBOT-ASSISTED, USING DA VI  9/29/2021    Procedure: HYSTERECTOMY,SUPRACERVICAL,ROBOT-ASSISTED,USING DA AMRIT XI;  Surgeon: Preethi mAezcua M.D.;  Location: SURGERY [facility];  Service: Gyn Robotic    SALPINGO OOPHORECTOMY Bilateral 9/29/2021    Procedure: SALPINGO-OOPHORECTOMY;  Surgeon: Preethi Amezcua M.D.;  Location: SURGERY [facility];  Service: Gyn Robotic    ORIF, WRIST      right    TUBAL COAGULATION LAPAROSCOPIC BILATERAL         SOCIAL HISTORY:  Social History     Tobacco Use    Smoking status: Former     Current packs/day: 0.10     Average packs/day: 0.1 packs/day for 4.0 years (0.4 ttl pk-yrs)     Types: Cigarettes    Smokeless tobacco: Never    Tobacco comments:     smoked socailly in her 20's   Vaping Use    Vaping Use: Never used   Substance Use Topics    Alcohol use: Yes     Comment: 1-2 several nights per week on average    Drug use: No        PHYSICAL EXAMINATION:  Vital Signs: BP (!) 136/90 (BP Location: Right arm, Patient Position: Sitting, BP Cuff Size: Adult)   Wt 148 lb   LMP 01/01/2001   BMI 22.50 kg/m²    Constitutional: The patient is well developed and well nourished.  Psychiatric: Patient is oriented to  time place and person.   Skin: No rash observed.  Neck: Appears symmetric. There are no masses or adenopathy present.  Cardiovascular: Regular rate and rhythm without murmur, rub or gallop.   Lungs: no rales, rhonchi or wheezes bilaterally. Clear to ausculation bilaterally.   Breast: Inspection reveals no asymetry or nipple discharge, no skin thickening, dimpling or erythema.  Palpation demonstrates no masses.  Abdomen: Soft, non-tender. BS x 4.   Pelvic Exam:       Vulva: External female genitalia within normal limits. No lesions      Urethra - no lesions, no erythema      Vagina: moist, pink, decreased ruggae      Cervix: pink, smooth, no lesions, no CMT,       Uterus - non-tender, normal size, shape, contour, mobile, anteverted      Ovaries: non-tender, no appreciable masses    Pap Smear Performed: {Yes    Chaperone Present:  Marshall Chawla MA  Extremeties: Legs are symmetric and without tenderness. There is no edema present.      ASSESSMENT AND PLAN:  73 y.o. .here for annual exam. See HPI for additional details.     1. Well woman exam with routine gynecological exam  Continue to utilize vaginal estrogen.  Encouraged healthy diet, adequate hydration and exercise.  Self breast exam was taught, reviewed safe sex and STI screening if indicated.    2. Encounter for Papanicolaou smear for cervical cancer screening  I will follow up with patient once results are back as per ASCCP guidelines.  - THINPREP PAP WITH HPV    3. Screening for HPV (human papillomavirus)  - THINPREP PAP WITH HPV    4. Menopausal vaginal dryness  Continue to use.   - estradiol (ESTRACE VAGINAL) 0.1 MG/GM vaginal cream; Apply 1g cream inside vagina twice per week  Dispense: 1 Each; Refill: 6    5. Encounter for screening mammogram for breast cancer  Will be due on 2024.  - MA-SCREENING MAMMO BILAT W/TOMOSYNTHESIS W/CAD; Future          Follow up: Annually or PRN    LEV [place]  2024    This dictation was  created using voice recognition software. The accuracy of the dictation is limited to the abilities of the software. I expect there may be some errors of grammar and possibly content.

## 2024-02-28 LAB
CYTOLOGIST CVX/VAG CYTO: NORMAL
CYTOLOGY CVX/VAG DOC CYTO: NORMAL
CYTOLOGY CVX/VAG DOC THIN PREP: NORMAL
HPV I/H RISK 4 DNA CVX QL PROBE+SIG AMP: NEGATIVE
NOTE NL11727A: NORMAL
OTHER STN SPEC: NORMAL
STAT OF ADQ CVX/VAG CYTO-IMP: NORMAL

## 2024-03-04 ENCOUNTER — HOSPITAL ENCOUNTER (OUTPATIENT)
Dept: RADIOLOGY | Facility: MEDICAL CENTER | Age: 74
End: 2024-03-04
Payer: COMMERCIAL

## 2024-04-17 NOTE — PROCEDURE: LIQUID NITROGEN
Approving, but needs appt for additional refills.   
50
Aperture Size (Optional): A
Duration Of Freeze Thaw-Cycle (Seconds): 3
Consent: The patient's consent was obtained including but not limited to risks of crusting, scabbing, blistering, scarring, darker or lighter pigmentary change, recurrence, incomplete removal and infection.
Render Post-Care Instructions In Note?: yes
Post-Care Instructions: I reviewed with the patient in detail post-care instructions. Patient is to wear sunprotection, and avoid picking at any of the treated lesions. Pt may apply Vaseline to crusted or scabbing areas.
Render Note In Bullet Format When Appropriate: No
Detail Level: Detailed
Number Of Freeze-Thaw Cycles: 1 freeze-thaw cycle

## 2024-06-20 ENCOUNTER — HOSPITAL ENCOUNTER (OUTPATIENT)
Dept: RADIOLOGY | Facility: MEDICAL CENTER | Age: 74
End: 2024-06-20
Attending: NURSE PRACTITIONER
Payer: MEDICARE

## 2024-06-20 DIAGNOSIS — Z12.31 ENCOUNTER FOR SCREENING MAMMOGRAM FOR BREAST CANCER: ICD-10-CM

## 2024-06-20 PROCEDURE — 77063 BREAST TOMOSYNTHESIS BI: CPT

## 2024-07-22 ENCOUNTER — HOSPITAL ENCOUNTER (OUTPATIENT)
Dept: RADIOLOGY | Facility: MEDICAL CENTER | Age: 74
End: 2024-07-22
Attending: FAMILY MEDICINE
Payer: MEDICARE

## 2024-07-22 DIAGNOSIS — I10 ESSENTIAL HYPERTENSION, MALIGNANT: ICD-10-CM

## 2024-07-22 DIAGNOSIS — R20.2 PARESTHESIA OF BOTH FEET: ICD-10-CM

## 2024-07-22 PROCEDURE — 93922 UPR/L XTREMITY ART 2 LEVELS: CPT

## 2024-11-27 ENCOUNTER — OFFICE VISIT (OUTPATIENT)
Dept: URGENT CARE | Facility: PHYSICIAN GROUP | Age: 74
End: 2024-11-27
Payer: MEDICARE

## 2024-11-27 ENCOUNTER — HOSPITAL ENCOUNTER (OUTPATIENT)
Facility: MEDICAL CENTER | Age: 74
End: 2024-11-27
Attending: NURSE PRACTITIONER
Payer: MEDICARE

## 2024-11-27 VITALS
TEMPERATURE: 97.2 F | DIASTOLIC BLOOD PRESSURE: 86 MMHG | BODY MASS INDEX: 22.98 KG/M2 | HEART RATE: 76 BPM | OXYGEN SATURATION: 97 % | SYSTOLIC BLOOD PRESSURE: 128 MMHG | RESPIRATION RATE: 18 BRPM | WEIGHT: 143 LBS | HEIGHT: 66 IN

## 2024-11-27 DIAGNOSIS — N89.8 VAGINAL DISCHARGE: ICD-10-CM

## 2024-11-27 DIAGNOSIS — N89.8 VAGINAL ITCHING: ICD-10-CM

## 2024-11-27 PROCEDURE — 99213 OFFICE O/P EST LOW 20 MIN: CPT | Performed by: NURSE PRACTITIONER

## 2024-11-27 PROCEDURE — 3074F SYST BP LT 130 MM HG: CPT | Performed by: NURSE PRACTITIONER

## 2024-11-27 PROCEDURE — 87660 TRICHOMONAS VAGIN DIR PROBE: CPT

## 2024-11-27 PROCEDURE — 87480 CANDIDA DNA DIR PROBE: CPT

## 2024-11-27 PROCEDURE — 3079F DIAST BP 80-89 MM HG: CPT | Performed by: NURSE PRACTITIONER

## 2024-11-27 PROCEDURE — 87510 GARDNER VAG DNA DIR PROBE: CPT

## 2024-11-27 RX ORDER — MOMETASONE FUROATE 1 MG/G
1 CREAM TOPICAL
COMMUNITY

## 2024-11-27 RX ORDER — FAMOTIDINE 40 MG/1
TABLET, FILM COATED ORAL
COMMUNITY
Start: 2024-10-18

## 2024-11-28 DIAGNOSIS — B37.31 VAGINAL CANDIDIASIS: ICD-10-CM

## 2024-11-28 LAB
CANDIDA DNA VAG QL PROBE+SIG AMP: NEGATIVE
G VAGINALIS DNA VAG QL PROBE+SIG AMP: NEGATIVE
T VAGINALIS DNA VAG QL PROBE+SIG AMP: NEGATIVE

## 2024-11-28 RX ORDER — FLUCONAZOLE 150 MG/1
150 TABLET ORAL DAILY
Qty: 2 TABLET | Refills: 0 | Status: SHIPPED | OUTPATIENT
Start: 2024-11-28 | End: 2024-11-30

## 2024-11-28 NOTE — PROGRESS NOTES
Verbal consent was acquired by the patient to use [employer] ambient listening note generation during this visit     Date: 11/27/24        Chief Complaint   Patient presents with    Vaginitis     Yeast infection, Pt states has a mesh holding her bladder up, is concerned has to do with that           History of Present Illness  The patient is a 74-year-old female who presents for evaluation of yeast infection.    She initially suspected a yeast infection due to a burning and tingling sensation, but without any itching. She self-treated with Monistat, which seemed to alleviate the symptoms temporarily. However, the symptoms reappeared within 5 days. She then used Monistat in tube form, but the symptoms persist. She has a mesh in place to support her bladder and is concerned that this might be contributing to her symptoms. She applied Monistat last night and is currently feeling anxious about her condition.  She reports that the total time of symptoms has been close to three weeks, but is worried about testing due to the presence of the monistat.          ROS:    No severe shortness of breath   No cardiac like chest pain, as discussed   As otherwise stated in HPI    Medical/SX/ Social History:  Reviewed per chart    Pertinent Medications:    Current Outpatient Medications on File Prior to Visit   Medication Sig Dispense Refill    benazepril (LOTENSIN) 40 MG tablet Take 40 mg by mouth every day.      Calcium Carb-Cholecalciferol (CALCIUM PLUS VITAMIN D3 PO) Take 1 Tablet by mouth every day.      famotidine (PEPCID) 40 MG Tab  (Patient not taking: Reported on 11/27/2024)      mometasone (ELOCON) 0.1 % Cream 1 Application. (Patient not taking: Reported on 11/27/2024)      estradiol (ESTRACE VAGINAL) 0.1 MG/GM vaginal cream Apply 1g cream inside vagina twice per week (Patient not taking: Reported on 11/27/2024) 1 Each 6    tretinoin (RETIN-A) 0.025 % cream APPLY A PEA SIZE AMOUNT OF CREAM TOPICALLY AT BEDTIME EVERY OTHER  NIGHT, THEN INCREASE AS TOLERATED (Patient not taking: Reported on 11/27/2024)      Multiple Vitamins-Minerals (CENTRUM SILVER 50+WOMEN) Tab Take 1 Tablet by mouth every day. (Patient not taking: Reported on 2/23/2024)      Misc Natural Products (GLUCOSAMINE CHONDROITIN ADV PO) Take 1 Tablet by mouth every day. (Patient not taking: Reported on 11/27/2024)      acetaminophen (TYLENOL) 500 MG Tab Take 1000mg (2 tab) three times daily for the first 7 days after surgery. After 7 days, you may take 500mg (1 tab) every 4 hours as needed for pain. (Patient not taking: Reported on 11/27/2024) 60 Tablet 1    ibuprofen (MOTRIN) 400 MG Tab Take 800mg every 8 hours for the first 7 days after your surgery. After 7 days, you may take 400mg every 4-8 hours as needed for pain. (Patient not taking: Reported on 11/27/2024) 60 Tablet 1    polyethylene glycol/lytes (MIRALAX) 17 g Pack Take 1 Packet by mouth every day. Take once every day to prevent constipation after surgery. Hold if you develop diarrhea, then resume the next day. (Patient not taking: Reported on 2/23/2024) 30 Each 1    estradiol (ESTRACE VAGINAL) 0.1 MG/GM vaginal cream Apply 1g cream inside vagina nightly for 2 weeks, then twice per week thereafter 1 Each 3    omeprazole (PRILOSEC) 20 MG delayed-release capsule Take 1 Cap by mouth every day. (Patient not taking: Reported on 11/27/2024) 90 Cap 1     No current facility-administered medications on file prior to visit.        Allergies:    Patient has no known allergies.     Problem list, medications, and allergies reviewed by myself today in Epic     Physical Exam:    Vitals:    11/27/24 1543   BP: 128/86   Pulse: 76   Resp: 18   Temp: 36.2 °C (97.2 °F)   SpO2: 97%             Physical Exam  Vitals and nursing note reviewed.   Constitutional:       General: She is not in acute distress.     Appearance: Normal appearance. She is not ill-appearing or toxic-appearing.   HENT:      Head: Normocephalic and atraumatic.       Nose: Nose normal.      Mouth/Throat:      Mouth: Mucous membranes are moist.      Pharynx: Oropharynx is clear.   Eyes:      Extraocular Movements: Extraocular movements intact.      Conjunctiva/sclera: Conjunctivae normal.      Pupils: Pupils are equal, round, and reactive to light.   Cardiovascular:      Rate and Rhythm: Normal rate.      Pulses: Normal pulses.   Pulmonary:      Effort: Pulmonary effort is normal.   Genitourinary:     Comments: Self swab taken  Musculoskeletal:         General: Normal range of motion.      Cervical back: Normal range of motion.   Skin:     General: Skin is warm.      Capillary Refill: Capillary refill takes less than 2 seconds.   Neurological:      General: No focal deficit present.      Mental Status: She is alert and oriented to person, place, and time.              Medical Decision making and plan :  I personally reviewed prior external notes and test results pertinent to today's visit. Pt is clinically stable at today's acute urgent care visit.  Patient appears nontoxic with no acute distress noted. Appropriate for outpatient care at this time.    Pleasant 74 y.o. female presented clinic with:     Assessment & Plan   Vaginal itching/burning, acute.   The symptoms could be indicative of either a yeast infection or bacterial vaginosis. An infection related to the mesh is less likely. A vaginal pathogen swab will be performed today, with results expected tomorrow. If the swab is negative for yeast but positive for bacterial vaginosis, appropriate treatment will be initiated. If the swab is negative for both, an oral dose of fluconazole will be considered, as the results may be skewed due to the use of Monistat. Should the infection persist despite treatment, a consultation with a gynecologist will be arranged.          Shared decision-making was utilized with patient for treatment plan. Medication discussed included indication for use and the potential benefits and side effects.  Education was provided regarding the aforementioned assessments.  Differential Diagnosis, natural history, and supportive care discussed. All of the patient's questions were answered to their satisfaction at the time of discharge. Patient was encouraged to monitor symptoms closely. Those signs and symptoms which would warrant concern and mandate seeking a higher level of service through the emergency department discussed at length.  Patient stated agreement and understanding of this plan of care.    Disposition:  Home in stable condition     Voice Recognition Disclaimer:  Portions of this document were created using voice recognition software and [employer] technology provided by [employer]. The software does have a chance of producing errors of grammar and possibly content. I have made every reasonable attempt to correct obvious errors, but there may be errors of grammar and possibly content that I did not discover before finalizing the  documentation.    ANTOINE Pena.

## 2024-12-09 ENCOUNTER — GYNECOLOGY VISIT (OUTPATIENT)
Dept: OBGYN | Facility: CLINIC | Age: 74
End: 2024-12-09
Payer: MEDICARE

## 2024-12-09 ENCOUNTER — HOSPITAL ENCOUNTER (OUTPATIENT)
Facility: MEDICAL CENTER | Age: 74
End: 2024-12-09
Payer: MEDICARE

## 2024-12-09 VITALS
HEIGHT: 68 IN | DIASTOLIC BLOOD PRESSURE: 67 MMHG | SYSTOLIC BLOOD PRESSURE: 113 MMHG | WEIGHT: 147 LBS | BODY MASS INDEX: 22.28 KG/M2

## 2024-12-09 DIAGNOSIS — N94.9 VAGINAL DISCOMFORT: ICD-10-CM

## 2024-12-09 DIAGNOSIS — R10.2 SUPRAPUBIC PAIN: ICD-10-CM

## 2024-12-09 LAB
APPEARANCE UR: NORMAL
BILIRUB UR STRIP-MCNC: NEGATIVE MG/DL
CANDIDA DNA VAG QL PROBE+SIG AMP: NEGATIVE
COLOR UR AUTO: NORMAL
G VAGINALIS DNA VAG QL PROBE+SIG AMP: POSITIVE
GLUCOSE UR STRIP.AUTO-MCNC: NEGATIVE MG/DL
KETONES UR STRIP.AUTO-MCNC: NEGATIVE MG/DL
LEUKOCYTE ESTERASE UR QL STRIP.AUTO: NEGATIVE
NITRITE UR QL STRIP.AUTO: NEGATIVE
PH UR STRIP.AUTO: 5.5 [PH] (ref 5–8)
PROT UR QL STRIP: 30 MG/DL
RBC UR QL AUTO: NEGATIVE
SP GR UR STRIP.AUTO: 1.02
T VAGINALIS DNA VAG QL PROBE+SIG AMP: NEGATIVE
UROBILINOGEN UR STRIP-MCNC: 0.2 MG/DL

## 2024-12-09 PROCEDURE — 87086 URINE CULTURE/COLONY COUNT: CPT

## 2024-12-09 PROCEDURE — 87660 TRICHOMONAS VAGIN DIR PROBE: CPT

## 2024-12-09 PROCEDURE — 3078F DIAST BP <80 MM HG: CPT

## 2024-12-09 PROCEDURE — 99459 PELVIC EXAMINATION: CPT

## 2024-12-09 PROCEDURE — 81002 URINALYSIS NONAUTO W/O SCOPE: CPT

## 2024-12-09 PROCEDURE — 87480 CANDIDA DNA DIR PROBE: CPT

## 2024-12-09 PROCEDURE — 99213 OFFICE O/P EST LOW 20 MIN: CPT

## 2024-12-09 PROCEDURE — 3074F SYST BP LT 130 MM HG: CPT

## 2024-12-09 PROCEDURE — 87510 GARDNER VAG DNA DIR PROBE: CPT

## 2024-12-09 NOTE — PROGRESS NOTES
GYN PROBLEM VISIT    CC:  Gynecologic Exam       HPI: Patient is a 74 y.o.  Patient's last menstrual period was 2001.  History of  Robotic Supracervical hysterectomy, BSO, sacrocolpopexy with Dr. Amezcua on 21. Had no  symptoms post-op until now. Experiencing vaginal burning/tingling of the vagina and mild lower abdominal cramping for the past month. Self-treated with monistat pearls first, then tube, and presented to urgent care on 2024. They collected a vaginal path swab which was negative for BV, candida, and trichomonas. She was prescribed fluconazole, took both pills and is still experiencing burning tingling although less severe. Last dose of fluconazole taken was 2024.    Conveys using estradiol cream 1x/week and not 2x/weekly as prescribed. Patient conveys hardly ever drinking water due to her being on the go a lot.     She is not currently sexually active, but is in a new relationship and may become sexually active soon. Would like to make sure she is as healthy as possible before having sexual intercourse.     Denies: abnormal discharge, vaginal bleeding, N/V, fever/chills, dysuria, frequency of urination, urgency with urination, itching, skin changes.      ROS:   General: denies fever / chills  HEENT: denies sore throat:  CV: denies chest pain:  Repiratory: denies shortness of breath  GI: denies abdominal pain  : denies dysuria:    PFSH:  I personally reviewed the past medical and surgical histories.     Social History     Tobacco Use    Smoking status: Former     Current packs/day: 0.10     Average packs/day: 0.1 packs/day for 4.0 years (0.4 ttl pk-yrs)     Types: Cigarettes    Smokeless tobacco: Never    Tobacco comments:     smoked socailly in her 20's   Vaping Use    Vaping status: Never Used   Substance Use Topics    Alcohol use: Yes     Comment: 1-2 several nights per week on average    Drug use: No        Social History     Substance and Sexual Activity   Sexual  "Activity Not Currently    Partners: Male    Birth control/protection: None        ALLERGIES / REACTIONS:  No Known Allergies                        PHYSICAL EXAMINATION:  Vital Signs:   /67 (BP Location: Left arm, Patient Position: Sitting, BP Cuff Size: Adult)   Ht 5' 8\"   Wt 147 lb   LMP 2001   BMI 22.35 kg/m²     Gen: appears well, NAD  Respiratory: normal effort  Abdomen: Soft, non-tender.  Pelvic Exam:    Vulva: thin, dry. No lesions    Urethra: normal.   Vagina: atrophy apparent with decreased rugae, pink   Cervix: pink, moist, no erythema or cervical motion tenderness.    Perineum: normal.  Chaperone Present: Hank Reyes MA    ASSESSMENT AND PLAN:  74 y.o.      Assessment & Plan  Vaginal discomfort    Orders:    POCT Urinalysis    VAGINAL PATHOGENS DNA PANEL; Future    URINE CULTURE(NEW); Future    Suprapubic pain    Orders:    POCT Urinalysis    VAGINAL PATHOGENS DNA PANEL; Future    URINE CULTURE(NEW); Future     Due to patient's discomfort and symptoms recollected VPIII  and urine culture to ensure infectious etiology is not the underlying cause of her symptoms. Encouraged patient to utilize estradiol cream 2x/weekly as prescribed, and on days when she does not utilize estradiol cream utilize petroleum jelly or coconut oil after patting dry post shower to help with hydrating tissue. Also discussed increasing water intake.     If symptoms fail to improve discussed f/u with Dr. Amezcua     Follow up PRN    Jazmyn Winslow PRIVER.      "

## 2024-12-09 NOTE — PROGRESS NOTES
Pt here as a GYN Visit  Pt states: used mono stat 11/12 USED THE [facility] BUT ABOUT 4 DAYS LATER FELT THAT IT CAME BACK . Went to Urgent care 11/27/2024 and got prescribed medication took medication but didn't go away. Does have a mesh to hold bladder pt is wondering if that has something to do with the symptoms. No itching a little bit of burning/tingling inside and out and a little bit of cramping   LMP:Postmenopausal  Last PAP:2/28/2024 wnl  BCM:postmenopausal  Mammo:6/20/2024  Phone/Pharmacy Verified  Dexa:6/19/2023  Colonoscopy 2020 wnl

## 2024-12-11 LAB
BACTERIA UR CULT: NORMAL
SIGNIFICANT IND 70042: NORMAL
SITE SITE: NORMAL
SOURCE SOURCE: NORMAL

## 2024-12-11 RX ORDER — METRONIDAZOLE 500 MG/1
500 TABLET ORAL 2 TIMES DAILY
Qty: 14 TABLET | Refills: 0 | Status: SHIPPED | OUTPATIENT
Start: 2024-12-11 | End: 2024-12-18

## 2025-01-20 ENCOUNTER — APPOINTMENT (OUTPATIENT)
Dept: URBAN - METROPOLITAN AREA CLINIC 15 | Facility: CLINIC | Age: 75
Setting detail: DERMATOLOGY
End: 2025-01-20

## 2025-01-20 DIAGNOSIS — L57.8 OTHER SKIN CHANGES DUE TO CHRONIC EXPOSURE TO NONIONIZING RADIATION: ICD-10-CM

## 2025-01-20 DIAGNOSIS — D485 NEOPLASM OF UNCERTAIN BEHAVIOR OF SKIN: ICD-10-CM

## 2025-01-20 DIAGNOSIS — D22 MELANOCYTIC NEVI: ICD-10-CM

## 2025-01-20 DIAGNOSIS — L82.1 OTHER SEBORRHEIC KERATOSIS: ICD-10-CM

## 2025-01-20 DIAGNOSIS — D18.0 HEMANGIOMA: ICD-10-CM

## 2025-01-20 DIAGNOSIS — Z71.89 OTHER SPECIFIED COUNSELING: ICD-10-CM

## 2025-01-20 DIAGNOSIS — L81.4 OTHER MELANIN HYPERPIGMENTATION: ICD-10-CM

## 2025-01-20 DIAGNOSIS — L70.0 ACNE VULGARIS: ICD-10-CM

## 2025-01-20 DIAGNOSIS — L90.5 SCAR CONDITIONS AND FIBROSIS OF SKIN: ICD-10-CM

## 2025-01-20 PROBLEM — D22.5 MELANOCYTIC NEVI OF TRUNK: Status: ACTIVE | Noted: 2025-01-20

## 2025-01-20 PROBLEM — D18.01 HEMANGIOMA OF SKIN AND SUBCUTANEOUS TISSUE: Status: ACTIVE | Noted: 2025-01-20

## 2025-01-20 PROBLEM — D48.5 NEOPLASM OF UNCERTAIN BEHAVIOR OF SKIN: Status: ACTIVE | Noted: 2025-01-20

## 2025-01-20 PROCEDURE — ? BIOPSY BY SHAVE METHOD

## 2025-01-20 PROCEDURE — ? SUNSCREEN RECOMMENDATIONS

## 2025-01-20 PROCEDURE — ? DIAGNOSIS COMMENT

## 2025-01-20 PROCEDURE — 99213 OFFICE O/P EST LOW 20 MIN: CPT | Mod: 25

## 2025-01-20 PROCEDURE — ? COUNSELING

## 2025-01-20 PROCEDURE — 11102 TANGNTL BX SKIN SINGLE LES: CPT

## 2025-01-20 ASSESSMENT — LOCATION DETAILED DESCRIPTION DERM
LOCATION DETAILED: LEFT SUPERIOR CENTRAL MALAR CHEEK
LOCATION DETAILED: RIGHT BUTTOCK
LOCATION DETAILED: RIGHT DISTAL RADIAL DORSAL FOREARM
LOCATION DETAILED: RIGHT SUPERIOR LATERAL LOWER BACK
LOCATION DETAILED: RIGHT RADIAL DORSAL HAND
LOCATION DETAILED: LEFT INFERIOR CENTRAL MALAR CHEEK
LOCATION DETAILED: LEFT BUTTOCK
LOCATION DETAILED: LEFT PROXIMAL CALF

## 2025-01-20 ASSESSMENT — LOCATION ZONE DERM
LOCATION ZONE: HAND
LOCATION ZONE: TRUNK
LOCATION ZONE: FACE
LOCATION ZONE: ARM
LOCATION ZONE: LEG

## 2025-01-20 ASSESSMENT — LOCATION SIMPLE DESCRIPTION DERM
LOCATION SIMPLE: RIGHT BUTTOCK
LOCATION SIMPLE: LEFT CHEEK
LOCATION SIMPLE: RIGHT FOREARM
LOCATION SIMPLE: LEFT BUTTOCK
LOCATION SIMPLE: LEFT CALF
LOCATION SIMPLE: RIGHT LOWER BACK
LOCATION SIMPLE: RIGHT HAND

## 2025-01-20 NOTE — PROCEDURE: BIOPSY BY SHAVE METHOD
Post-Care Instructions: I reviewed with the patient in detail post-care instructions. Patient is to keep the biopsy site dry overnight, and then apply bacitracin or petroleum ointment to the treated site twice daily until healed.  Patient to advised to look for signs of infection such as redness, purulent drainage, pain, radiating heat from treated site and a low grade fever, if any symptoms occur patient to call the office or return to clinic.

## 2025-01-20 NOTE — PROCEDURE: DIAGNOSIS COMMENT
Detail Level: Detailed
Render Risk Assessment In Note?: no
Comment: History of Solar [place] - [id number].

## 2025-05-06 DIAGNOSIS — N95.1 MENOPAUSAL VAGINAL DRYNESS: ICD-10-CM

## 2025-05-06 NOTE — TELEPHONE ENCOUNTER
Received request via: Patient    Was the patient seen in the last year in this department? Yes 12/9/24    Does the patient have an active prescription (recently filled or refills available) for medication(s) requested? No      Does the patient have long term Plus and need 100-day supply? (This applies to ALL medications)   Patient does not have SCP

## 2025-05-07 RX ORDER — ESTRADIOL 0.1 MG/G
CREAM VAGINAL
Qty: 43 G | Refills: 6 | Status: SHIPPED | OUTPATIENT
Start: 2025-05-07

## 2025-07-03 ENCOUNTER — HOSPITAL ENCOUNTER (OUTPATIENT)
Dept: RADIOLOGY | Facility: MEDICAL CENTER | Age: 75
End: 2025-07-03
Attending: FAMILY MEDICINE
Payer: MEDICARE

## 2025-07-03 DIAGNOSIS — Z12.31 ENCOUNTER FOR MAMMOGRAM TO ESTABLISH BASELINE MAMMOGRAM: ICD-10-CM

## 2025-07-03 PROCEDURE — 77063 BREAST TOMOSYNTHESIS BI: CPT

## 2025-07-14 ENCOUNTER — HOSPITAL ENCOUNTER (OUTPATIENT)
Facility: MEDICAL CENTER | Age: 75
End: 2025-07-14
Attending: FAMILY MEDICINE
Payer: MEDICARE

## 2025-07-14 DIAGNOSIS — R92.8 ABNORMAL MAMMOGRAM: ICD-10-CM

## 2025-07-14 PROCEDURE — G0279 TOMOSYNTHESIS, MAMMO: HCPCS

## 2025-07-14 PROCEDURE — 76642 ULTRASOUND BREAST LIMITED: CPT | Mod: LT

## (undated) DEVICE — SET SUCTION/IRRIGATION WITH DISPOSABLE TIP (6/CA )PART #0250-070-520 IS A SUB

## (undated) DEVICE — KIT ANESTHESIA W/CIRCUIT & 3/LT BAG W/FILTER (20EA/CA)

## (undated) DEVICE — SUTURE GENERAL

## (undated) DEVICE — SET IRRIGATION CYSTOSCOPY TUBE L80 IN (20EA/CA)

## (undated) DEVICE — TOWELS CLOTH SURGICAL - (4/PK 20PK/CA)

## (undated) DEVICE — SET TUBING PNEUMOCLEAR HIGH FLOW SMOKE EVACUATION (10EA/BX)

## (undated) DEVICE — ELECTRODE 850 FOAM ADHESIVE - HYDROGEL RADIOTRNSPRNT (50/PK)

## (undated) DEVICE — TUBE E-T HI-LO CUFF 7.0MM (10EA/PK)

## (undated) DEVICE — SET LEADWIRE 5 LEAD BEDSIDE DISPOSABLE ECG (1SET OF 5/EA)

## (undated) DEVICE — GLOVE BIOGEL PI INDICATOR SZ 6.5 SURGICAL PF LF - (50/BX 4BX/CA)

## (undated) DEVICE — SEAL 5MM-8MM UNIVERSAL  BOX OF 10

## (undated) DEVICE — GOWN WARMING STANDARD FLEX - (30/CA)

## (undated) DEVICE — OBTURATOR BLADELESS STANDARD 8MM (6EA/BX)

## (undated) DEVICE — ROBOTIC SURGERY SERVICES

## (undated) DEVICE — GOWN SURGEONS X-LARGE - DISP. (30/CA)

## (undated) DEVICE — DRAPE ARM  BOX OF 20

## (undated) DEVICE — GLOVE SZ 7.5 BIOGEL PI MICRO - PF LF (50PR/BX)

## (undated) DEVICE — WATER IRRIG. STER 3000 ML - (4/CA)

## (undated) DEVICE — JELLY SURGILUBE STERILE TUBE 4.25 OZ (1/EA)

## (undated) DEVICE — COVER FOOT UNIVERSAL DISP. - (25EA/CA)

## (undated) DEVICE — SYSTEM CLEARIFY VISUALIZATION (10EA/PK)

## (undated) DEVICE — DRIVER LARGE SUTURECUT NEEDLE (15UN/EA)

## (undated) DEVICE — SUTURE 0 VICRYL PLUS UR-6 - 27 INCH (36/BX)

## (undated) DEVICE — TROCAR LAPSCP 100MM 12MM NTHRD - (6/BX)

## (undated) DEVICE — TUBE CONNECTING SUCTION - CLEAR PLASTIC STERILE 72 IN (50EA/CA)

## (undated) DEVICE — PAD SANITARY 11IN MAXI IND WRAPPED  (12EA/PK 24PK/CA)

## (undated) DEVICE — SENSOR SPO2 NEO LNCS ADHESIVE (20/BX) SEE USER NOTES

## (undated) DEVICE — ARMREST CRADLE FOAM - (2PR/PK 12PR/CA)

## (undated) DEVICE — GOWN SURGICAL X-LARGE ULTRA - FILM-REINFORCED (20/CA)

## (undated) DEVICE — GLOVE BIOGEL SZ 7 SURGICAL PF LTX - (50PR/BX 4BX/CA)

## (undated) DEVICE — DRAPE COLUMN  BOX OF 20

## (undated) DEVICE — GLOVE BIOGEL SZ 7.5 SURGICAL PF LTX - (50PR/BX 4BX/CA)

## (undated) DEVICE — FORCEP BIPOLAR MARYLAND DA VINCI 10X'S REUSABLE (10UN/EA)

## (undated) DEVICE — TUBE CONNECT SUCTION CLEAR 120 X 1/4" (50EA/CA)"

## (undated) DEVICE — BRIEF STRETCH MATERNITY M/L - FITS 20-60IN (5EA/BG 20BG/CA)

## (undated) DEVICE — GLOVE BIOGEL PI ORTHO SZ 6 1/2 SURGICAL PF LF (40PR/BX)

## (undated) DEVICE — GLOVE SZ 6 BIOGEL PI MICRO - PF LF (50PR/BX 4BX/CA)

## (undated) DEVICE — CANISTER SUCTION RIGID RED 1500CC (40EA/CA)

## (undated) DEVICE — GLOVE BIOGEL PI INDICATOR SZ 8.0 SURGICAL PF LF -(50/BX 4BX/CA)

## (undated) DEVICE — FORCEPS MARYLAND BIPOLAR DA VINCI 10X'S REUSABLE

## (undated) DEVICE — LACTATED RINGERS INJ 1000 ML - (14EA/CA 60CA/PF)

## (undated) DEVICE — PACK GYN DAVINCI (2EA/CA)

## (undated) DEVICE — SLEEVE VASO CALF MED - (10PR/CA)

## (undated) DEVICE — DRAPE VAGINAL BIB W/ POUCH (10EA/CA)

## (undated) DEVICE — SUTURE 2-0 PROLENE SH D/A (36PK/BX)

## (undated) DEVICE — NEEDLE DRIVER LARGE DA VINCI 10X'S REUSABLE

## (undated) DEVICE — WATER IRRIGATION STERILE 1000ML (12EA/CA)

## (undated) DEVICE — SUTURE GOR-TEX CV-2 TH-26 (2NO2A=12PK/BX)  (2NO2B=36PK/BX)

## (undated) DEVICE — MASK ANESTHESIA ADULT  - (100/CA)

## (undated) DEVICE — SCISSOR MONOPLR CRV(HOT SHEAR - DA VINCI 10X'S REUSABLE

## (undated) DEVICE — KIT SURGIFLO W/OUT THROMBIN - (6EA/CA)

## (undated) DEVICE — TUBING CLEARLINK DUO-VENT - C-FLO (48EA/CA)

## (undated) DEVICE — SUTURE 2-0 MONOCRYL SH

## (undated) DEVICE — DERMABOND ADVANCED - (12EA/BX)

## (undated) DEVICE — TIP SACROCERVICOPEXY

## (undated) DEVICE — KIT  I.V. START (100EA/CA)

## (undated) DEVICE — SUTURE 4-0 MONOCRYL PLUS PS-2 - 27 INCH (36/BX)

## (undated) DEVICE — CANISTER SUCTION 3000ML MECHANICAL FILTER AUTO SHUTOFF MEDI-VAC NONSTERILE LF DISP  (40EA/CA)

## (undated) DEVICE — APPLICATOR SURGIFLO - (6EA/CA)

## (undated) DEVICE — COVER TIP ENDOWRIST HOT SHEAR - (10EA/BX) DA VINCI

## (undated) DEVICE — SODIUM CHL IRRIGATION 0.9% 1000ML (12EA/CA)

## (undated) DEVICE — SCRUB SOLUTION EXIDINE 4% 40Z - 48/CS CHLORAHEXADINE GLUCONATE

## (undated) DEVICE — SUTURE 2-0 20CM STRATAFIX SPIRAL SH NEEDLE (12/BX)

## (undated) DEVICE — PAD OR TABLE DA VINCI 2IN X 20IN X 72IN - (12EA/CA)

## (undated) DEVICE — GLOVE BIOGEL INDICATOR SZ 7.5 SURGICAL PF LTX - (50PR/BX 4BX/CA)

## (undated) DEVICE — GLOVE BIOGEL INDICATOR SZ 7SURGICAL PF LTX - (50/BX 4BX/CA)

## (undated) DEVICE — SUCTION INSTRUMENT YANKAUER BULBOUS TIP W/O VENT (50EA/CA)

## (undated) DEVICE — PACK TRENGUARD 450 PROCEDURE (12EA/CA)

## (undated) DEVICE — MANIFOLD NEPTUNE 1 PORT (20/PK)

## (undated) DEVICE — SET EXTENSION WITH 2 PORTS (48EA/CA) ***PART #2C8610 IS A SUBSTITUTE*****

## (undated) DEVICE — GRASPER TIP UP FENESTRATED XI 10X'S REUSABLE (10UN/EA)

## (undated) DEVICE — SYRINGE ASEPTO - (50EA/CA

## (undated) DEVICE — NEPTUNE 4 PORT MANIFOLD - (20/PK)

## (undated) DEVICE — ELECTRODE DUAL RETURN W/ CORD - (50/PK)

## (undated) DEVICE — SUTURE 2-0 VICRYL PLUS SH - 27 INCH (36/BX)

## (undated) DEVICE — TOWEL STOP TIMEOUT SAFETY FLAG (40EA/CA)